# Patient Record
Sex: FEMALE | Race: WHITE | NOT HISPANIC OR LATINO | Employment: FULL TIME | URBAN - METROPOLITAN AREA
[De-identification: names, ages, dates, MRNs, and addresses within clinical notes are randomized per-mention and may not be internally consistent; named-entity substitution may affect disease eponyms.]

---

## 2023-04-25 LAB
ABO GROUP (TYPE) IN BLOOD: NORMAL
ANTIBODY SCREEN: NORMAL
RH FACTOR: NORMAL

## 2023-04-26 LAB
CHLAMYDIA TRACH., AMPLIFIED: NEGATIVE
ERYTHROCYTE DISTRIBUTION WIDTH (RATIO) BY AUTOMATED COUNT: 12.7 % (ref 11.5–14.5)
ERYTHROCYTE MEAN CORPUSCULAR HEMOGLOBIN CONCENTRATION (G/DL) BY AUTOMATED: 34.6 G/DL (ref 32–36)
ERYTHROCYTE MEAN CORPUSCULAR VOLUME (FL) BY AUTOMATED COUNT: 84 FL (ref 80–100)
ERYTHROCYTES (10*6/UL) IN BLOOD BY AUTOMATED COUNT: 4.35 X10E12/L (ref 4–5.2)
HEMATOCRIT (%) IN BLOOD BY AUTOMATED COUNT: 36.7 % (ref 36–46)
HEMOGLOBIN (G/DL) IN BLOOD: 12.7 G/DL (ref 12–16)
HEPATITIS B VIRUS SURFACE AG PRESENCE IN SERUM: NONREACTIVE
HEPATITIS C VIRUS AB PRESENCE IN SERUM: NONREACTIVE
HIV 1/ 2 AG/AB SCREEN: NONREACTIVE
LEUKOCYTES (10*3/UL) IN BLOOD BY AUTOMATED COUNT: 9.1 X10E9/L (ref 4.4–11.3)
N. GONORRHEA, AMPLIFIED: NEGATIVE
NRBC (PER 100 WBCS) BY AUTOMATED COUNT: 0 /100 WBC (ref 0–0)
PLATELETS (10*3/UL) IN BLOOD AUTOMATED COUNT: 250 X10E9/L (ref 150–450)
REFLEX ADDED, ANEMIA PANEL: NORMAL
RUBELLA VIRUS IGG AB: POSITIVE
SYPHILIS TOTAL AB: NONREACTIVE
URINE CULTURE: NO GROWTH

## 2023-06-08 LAB — LAB MOLECULAR CA TECHNICAL NOTES: NORMAL

## 2023-08-04 LAB
GLUCOSE, 1 HR SCREEN, PREG: 83 MG/DL
HEMATOCRIT (%) IN BLOOD BY AUTOMATED COUNT: 36.6 % (ref 36–46)
HEMOGLOBIN (G/DL) IN BLOOD: 12.5 G/DL (ref 12–16)

## 2023-09-14 VITALS — DIASTOLIC BLOOD PRESSURE: 64 MMHG | WEIGHT: 156 LBS | SYSTOLIC BLOOD PRESSURE: 116 MMHG | BODY MASS INDEX: 28.53 KG/M2

## 2023-10-02 ENCOUNTER — PROCEDURE VISIT (OUTPATIENT)
Dept: OBSTETRICS AND GYNECOLOGY | Facility: CLINIC | Age: 38
End: 2023-10-02
Payer: COMMERCIAL

## 2023-10-02 VITALS — SYSTOLIC BLOOD PRESSURE: 112 MMHG | WEIGHT: 161 LBS | DIASTOLIC BLOOD PRESSURE: 68 MMHG | BODY MASS INDEX: 29.45 KG/M2

## 2023-10-02 DIAGNOSIS — O09.523 MULTIGRAVIDA OF ADVANCED MATERNAL AGE IN THIRD TRIMESTER (HHS-HCC): Primary | ICD-10-CM

## 2023-10-02 DIAGNOSIS — Z34.83 SUPERVISION OF NORMAL INTRAUTERINE PREGNANCY IN MULTIGRAVIDA IN THIRD TRIMESTER (HHS-HCC): ICD-10-CM

## 2023-10-02 PROCEDURE — 99213 OFFICE O/P EST LOW 20 MIN: CPT | Performed by: OBSTETRICS & GYNECOLOGY

## 2023-10-02 PROCEDURE — 59025 FETAL NON-STRESS TEST: CPT | Performed by: OBSTETRICS & GYNECOLOGY

## 2023-10-02 NOTE — PROGRESS NOTES
"Subjective   Patient ID [id number]   Missy Paula is a 38 y.o.  at 33w6d with a working estimated date of delivery of 2023, by Last Menstrual Period who presents for a routine prenatal visit. She denies vaginal bleeding, leakage of fluid, decreased fetal movements, or contractions.    Her pregnancy is complicated by:  Advanced maternal age    Objective   Physical Exam:   Weight: 73 kg (161 lb)  Expected Total Weight Gain: 7 kg (15 lb)-11.5 kg (25 lb)   Pregravid BMI: 26.15  BP: 112/68                  Prenatal Labs  Urine Dip:  No results found for: \"KETONESU\", \"GLUCOSEUR\", \"LEUKOCYTESUR\"  Lab Results   Component Value Date    HGB 12.5 2023    HCT 36.6 2023    HEPBSAG NONREACTIVE 2023     No results found for: \"PAPPA\", \"AFP\", \"HCG\", \"ESTRIOL\", \"INHBA\"  No results found for: \"GLUF\", \"GLUT1\", \"NSDAKLK1VC\", \"SBJVARN5UM\"    Imaging  The most recent ultrasound was performed on   with a study GA of   and EFW of  .          Assessment/Plan   Nonstress test reactive.  Advanced maternal age.  Continue weekly NST.  Importance of daily fetal movement.  GBS at 36 weeks.  Continue prenatal vitamin.  Labs reviewed.    Expected mode of delivery vaginal  Follow up in 1 week for a routine prenatal visit.  "

## 2023-10-10 PROBLEM — R35.0 URINARY FREQUENCY: Status: ACTIVE | Noted: 2023-10-10

## 2023-10-10 PROBLEM — K14.6 BURNING TONGUE: Status: ACTIVE | Noted: 2023-10-10

## 2023-10-10 PROBLEM — O21.9 NAUSEA/VOMITING IN PREGNANCY (HHS-HCC): Status: ACTIVE | Noted: 2023-10-10

## 2023-10-10 PROBLEM — N91.2 AMENORRHEA: Status: ACTIVE | Noted: 2023-10-10

## 2023-10-10 PROBLEM — K60.1 CHRONIC ANAL FISSURE: Status: ACTIVE | Noted: 2023-10-10

## 2023-10-10 RX ORDER — FLUTICASONE PROPIONATE 50 MCG
SPRAY, SUSPENSION (ML) NASAL
COMMUNITY
Start: 2023-08-16 | End: 2024-02-27 | Stop reason: ALTCHOICE

## 2023-10-11 ENCOUNTER — PREP FOR PROCEDURE (OUTPATIENT)
Dept: OBSTETRICS AND GYNECOLOGY | Facility: HOSPITAL | Age: 38
End: 2023-10-11

## 2023-10-11 ENCOUNTER — PROCEDURE VISIT (OUTPATIENT)
Dept: OBSTETRICS AND GYNECOLOGY | Facility: CLINIC | Age: 38
End: 2023-10-11
Payer: COMMERCIAL

## 2023-10-11 VITALS — SYSTOLIC BLOOD PRESSURE: 104 MMHG | WEIGHT: 161 LBS | BODY MASS INDEX: 29.45 KG/M2 | DIASTOLIC BLOOD PRESSURE: 64 MMHG

## 2023-10-11 DIAGNOSIS — O21.9 NAUSEA/VOMITING IN PREGNANCY (HHS-HCC): ICD-10-CM

## 2023-10-11 DIAGNOSIS — O09.523 MULTIGRAVIDA OF ADVANCED MATERNAL AGE IN THIRD TRIMESTER (HHS-HCC): Primary | ICD-10-CM

## 2023-10-11 DIAGNOSIS — Z3A.35 35 WEEKS GESTATION OF PREGNANCY (HHS-HCC): ICD-10-CM

## 2023-10-11 DIAGNOSIS — Z34.90 TERM PREGNANCY (HHS-HCC): ICD-10-CM

## 2023-10-11 LAB
POC BLOOD, URINE: NEGATIVE
POC GLUCOSE, URINE: NEGATIVE MG/DL
POC LEUKOCYTES, URINE: NEGATIVE
POC NITRITE,URINE: NEGATIVE
POC PROTEIN, URINE: NEGATIVE MG/DL

## 2023-10-11 PROCEDURE — 81002 URINALYSIS NONAUTO W/O SCOPE: CPT | Performed by: OBSTETRICS & GYNECOLOGY

## 2023-10-11 PROCEDURE — 59025 FETAL NON-STRESS TEST: CPT | Performed by: OBSTETRICS & GYNECOLOGY

## 2023-10-11 RX ORDER — LIDOCAINE HYDROCHLORIDE 10 MG/ML
30 INJECTION INFILTRATION; PERINEURAL ONCE AS NEEDED
Status: CANCELLED | OUTPATIENT
Start: 2023-10-11

## 2023-10-11 RX ORDER — METOCLOPRAMIDE 5 MG/1
10 TABLET ORAL EVERY 6 HOURS PRN
Status: CANCELLED | OUTPATIENT
Start: 2023-10-11

## 2023-10-11 RX ORDER — METOCLOPRAMIDE HYDROCHLORIDE 5 MG/ML
10 INJECTION INTRAMUSCULAR; INTRAVENOUS EVERY 6 HOURS PRN
Status: CANCELLED | OUTPATIENT
Start: 2023-10-11

## 2023-10-11 RX ORDER — TRANEXAMIC ACID 100 MG/ML
1000 INJECTION, SOLUTION INTRAVENOUS ONCE AS NEEDED
Status: CANCELLED | OUTPATIENT
Start: 2023-10-11

## 2023-10-11 RX ORDER — ONDANSETRON HYDROCHLORIDE 2 MG/ML
4 INJECTION, SOLUTION INTRAVENOUS EVERY 6 HOURS PRN
Status: CANCELLED | OUTPATIENT
Start: 2023-10-11

## 2023-10-11 RX ORDER — NIFEDIPINE 10 MG/1
10 CAPSULE ORAL ONCE AS NEEDED
Status: CANCELLED | OUTPATIENT
Start: 2023-10-11

## 2023-10-11 RX ORDER — HYDRALAZINE HYDROCHLORIDE 20 MG/ML
5 INJECTION INTRAMUSCULAR; INTRAVENOUS ONCE AS NEEDED
Status: CANCELLED | OUTPATIENT
Start: 2023-10-11

## 2023-10-11 RX ORDER — CARBOPROST TROMETHAMINE 250 UG/ML
250 INJECTION, SOLUTION INTRAMUSCULAR ONCE AS NEEDED
Status: CANCELLED | OUTPATIENT
Start: 2023-10-11

## 2023-10-11 RX ORDER — OXYTOCIN 10 [USP'U]/ML
10 INJECTION, SOLUTION INTRAMUSCULAR; INTRAVENOUS ONCE AS NEEDED
Status: CANCELLED | OUTPATIENT
Start: 2023-10-11

## 2023-10-11 RX ORDER — LABETALOL HYDROCHLORIDE 5 MG/ML
20 INJECTION, SOLUTION INTRAVENOUS ONCE AS NEEDED
Status: CANCELLED | OUTPATIENT
Start: 2023-10-11

## 2023-10-11 RX ORDER — TERBUTALINE SULFATE 1 MG/ML
0.25 INJECTION SUBCUTANEOUS ONCE AS NEEDED
Status: CANCELLED | OUTPATIENT
Start: 2023-10-11

## 2023-10-11 RX ORDER — METHYLERGONOVINE MALEATE 0.2 MG/ML
0.2 INJECTION INTRAVENOUS ONCE AS NEEDED
Status: CANCELLED | OUTPATIENT
Start: 2023-10-11

## 2023-10-11 RX ORDER — ONDANSETRON 4 MG/1
4 TABLET, FILM COATED ORAL EVERY 6 HOURS PRN
Status: CANCELLED | OUTPATIENT
Start: 2023-10-11

## 2023-10-11 RX ORDER — SODIUM CHLORIDE, SODIUM LACTATE, POTASSIUM CHLORIDE, CALCIUM CHLORIDE 600; 310; 30; 20 MG/100ML; MG/100ML; MG/100ML; MG/100ML
125 INJECTION, SOLUTION INTRAVENOUS CONTINUOUS
Status: CANCELLED | OUTPATIENT
Start: 2023-10-11

## 2023-10-11 RX ORDER — LOPERAMIDE HYDROCHLORIDE 2 MG/1
4 CAPSULE ORAL EVERY 2 HOUR PRN
Status: CANCELLED | OUTPATIENT
Start: 2023-10-11

## 2023-10-11 RX ORDER — MISOPROSTOL 100 UG/1
800 TABLET ORAL ONCE AS NEEDED
Status: CANCELLED | OUTPATIENT
Start: 2023-10-11

## 2023-10-11 NOTE — PROGRESS NOTES
"Subjective   Patient ID [id number]   Missy Paula is a 38 y.o.  at 35w1d with a working estimated date of delivery of 2023, by Last Menstrual Period who presents for a routine prenatal visit. She denies vaginal bleeding, leakage of fluid, decreased fetal movements, or contractions.    Her pregnancy is complicated by:  Advanced maternal age.  Weekly nonstress test    Objective   Physical Exam:   Weight: 73 kg (161 lb)  Expected Total Weight Gain: 7 kg (15 lb)-11.5 kg (25 lb)   Pregravid BMI: 26.15  BP: 104/64  Fetal Heart Rate: nst Fundal Height (cm): 35 cm Presentation: Vertex           Prenatal Labs  Urine Dip:  No results found for: \"KETONESU\", \"GLUCOSEUR\", \"LEUKOCYTESUR\"  Lab Results   Component Value Date    HGB 12.5 2023    HCT 36.6 2023    HEPBSAG NONREACTIVE 2023     No results found for: \"PAPPA\", \"AFP\", \"HCG\", \"ESTRIOL\", \"INHBA\"  No results found for: \"GLUF\", \"GLUT1\", \"GQLDAFF8FA\", \"HOIJGTB6BT\"    Imaging  The most recent ultrasound was performed on   with a study GA of   and EFW of  .          Assessment/Plan     Continue prenatal vitamin.  Labs reviewed.  GBS next visit at 36 weeks  Expected mode of delivery vaginal  Follow up in 1 week for a routine prenatal visit.  "

## 2023-10-18 ENCOUNTER — PROCEDURE VISIT (OUTPATIENT)
Dept: OBSTETRICS AND GYNECOLOGY | Facility: CLINIC | Age: 38
End: 2023-10-18
Payer: COMMERCIAL

## 2023-10-18 VITALS — DIASTOLIC BLOOD PRESSURE: 66 MMHG | SYSTOLIC BLOOD PRESSURE: 106 MMHG | WEIGHT: 163 LBS | BODY MASS INDEX: 29.81 KG/M2

## 2023-10-18 DIAGNOSIS — Z3A.36 36 WEEKS GESTATION OF PREGNANCY (HHS-HCC): ICD-10-CM

## 2023-10-18 DIAGNOSIS — O21.9 NAUSEA/VOMITING IN PREGNANCY (HHS-HCC): ICD-10-CM

## 2023-10-18 DIAGNOSIS — O09.523 MULTIGRAVIDA OF ADVANCED MATERNAL AGE IN THIRD TRIMESTER (HHS-HCC): ICD-10-CM

## 2023-10-18 PROCEDURE — 59025 FETAL NON-STRESS TEST: CPT | Performed by: OBSTETRICS & GYNECOLOGY

## 2023-10-18 PROCEDURE — 81002 URINALYSIS NONAUTO W/O SCOPE: CPT | Performed by: OBSTETRICS & GYNECOLOGY

## 2023-10-18 PROCEDURE — 99213 OFFICE O/P EST LOW 20 MIN: CPT | Performed by: OBSTETRICS & GYNECOLOGY

## 2023-10-18 PROCEDURE — 87081 CULTURE SCREEN ONLY: CPT

## 2023-10-18 NOTE — PROGRESS NOTES
"Subjective   Patient ID [id number]   Missy Paula is a 38 y.o.  at 36w1d with a working estimated date of delivery of 2023, by Last Menstrual Period who presents for a routine prenatal visit. She denies vaginal bleeding, leakage of fluid, decreased fetal movements, or contractions.    Her pregnancy is complicated by:  Advanced maternal age    Objective   Physical Exam:   Weight: 73.9 kg (163 lb)  Expected Total Weight Gain: 7 kg (15 lb)-11.5 kg (25 lb)   Pregravid BMI: 26.15  BP: 106/66                  Prenatal Labs  Urine Dip:  No results found for: \"KETONESU\", \"GLUCOSEUR\", \"LEUKOCYTESUR\"  Lab Results   Component Value Date    HGB 12.5 2023    HCT 36.6 2023    ABO A 2023    HEPBSAG NONREACTIVE 2023     No results found for: \"PAPPA\", \"AFP\", \"HCG\", \"ESTRIOL\", \"INHBA\"  No results found for: \"GLUF\", \"GLUT1\", \"DTERAIV7VC\", \"TCPRFVU3ZQ\"    Imaging  The most recent ultrasound was performed on   with a study GA of   and EFW of  .          Assessment/Plan     Continue prenatal vitamin.  Labs reviewed.  GBS taken.  Expected mode of delivery vaginal  Follow up in 1 week for a routine prenatal visit.  "

## 2023-10-25 ENCOUNTER — PROCEDURE VISIT (OUTPATIENT)
Dept: OBSTETRICS AND GYNECOLOGY | Facility: CLINIC | Age: 38
End: 2023-10-25
Payer: COMMERCIAL

## 2023-10-25 VITALS — SYSTOLIC BLOOD PRESSURE: 116 MMHG | WEIGHT: 164 LBS | BODY MASS INDEX: 30 KG/M2 | DIASTOLIC BLOOD PRESSURE: 64 MMHG

## 2023-10-25 DIAGNOSIS — O09.523 MULTIGRAVIDA OF ADVANCED MATERNAL AGE IN THIRD TRIMESTER (HHS-HCC): ICD-10-CM

## 2023-10-25 DIAGNOSIS — O21.9 NAUSEA/VOMITING IN PREGNANCY (HHS-HCC): ICD-10-CM

## 2023-10-25 DIAGNOSIS — Z3A.37 37 WEEKS GESTATION OF PREGNANCY (HHS-HCC): ICD-10-CM

## 2023-10-25 DIAGNOSIS — Z34.90 TERM PREGNANCY (HHS-HCC): Primary | ICD-10-CM

## 2023-10-25 LAB
GP B STREP GENITAL QL CULT: ABNORMAL
POC BLOOD, URINE: NEGATIVE
POC GLUCOSE, URINE: NEGATIVE MG/DL
POC LEUKOCYTES, URINE: NEGATIVE
POC NITRITE,URINE: NEGATIVE
POC PROTEIN, URINE: NEGATIVE MG/DL

## 2023-10-25 PROCEDURE — 81002 URINALYSIS NONAUTO W/O SCOPE: CPT | Performed by: OBSTETRICS & GYNECOLOGY

## 2023-10-25 PROCEDURE — 99213 OFFICE O/P EST LOW 20 MIN: CPT | Performed by: OBSTETRICS & GYNECOLOGY

## 2023-10-25 PROCEDURE — 59025 FETAL NON-STRESS TEST: CPT | Performed by: OBSTETRICS & GYNECOLOGY

## 2023-10-25 NOTE — PROGRESS NOTES
"Subjective   Patient ID [id number]   Missy Paula is a 38 y.o.  at 37w1d with a working estimated date of delivery of 2023, by Last Menstrual Period who presents for a routine prenatal visit. She denies vaginal bleeding, leakage of fluid, decreased fetal movements, or contractions.    Her pregnancy is complicated by:  Advanced maternal age.  NST reactive.  GBS pending    Objective   Physical Exam:   Weight: 74.4 kg (164 lb)  Expected Total Weight Gain: 7 kg (15 lb)-11.5 kg (25 lb)   Pregravid BMI: 26.15  BP: 116/64                  Prenatal Labs  Urine Dip:  No results found for: \"KETONESU\", \"GLUCOSEUR\", \"LEUKOCYTESUR\"  Lab Results   Component Value Date    HGB 12.5 2023    HCT 36.6 2023    ABO A 2023    HEPBSAG NONREACTIVE 2023     No results found for: \"PAPPA\", \"AFP\", \"HCG\", \"ESTRIOL\", \"INHBA\"  No results found for: \"GLUF\", \"GLUT1\", \"BBPWTPU2HI\", \"LEVFMFM3YZ\"    Imaging  The most recent ultrasound was performed on   with a study GA of   and EFW of  .          Assessment/Plan     Continue prenatal vitamin.  Labs reviewed.  GBS taken.  Expected mode of delivery vaginal.  Review importance of daily fetal movement.  Signs and symptoms of labor  Follow up in 1 week for a routine prenatal visit.  "

## 2023-11-01 ENCOUNTER — PROCEDURE VISIT (OUTPATIENT)
Dept: OBSTETRICS AND GYNECOLOGY | Facility: CLINIC | Age: 38
End: 2023-11-01
Payer: COMMERCIAL

## 2023-11-01 VITALS — BODY MASS INDEX: 30 KG/M2 | SYSTOLIC BLOOD PRESSURE: 114 MMHG | WEIGHT: 164 LBS | DIASTOLIC BLOOD PRESSURE: 78 MMHG

## 2023-11-01 DIAGNOSIS — Z3A.38 38 WEEKS GESTATION OF PREGNANCY (HHS-HCC): ICD-10-CM

## 2023-11-01 DIAGNOSIS — O09.523 MULTIGRAVIDA OF ADVANCED MATERNAL AGE IN THIRD TRIMESTER (HHS-HCC): ICD-10-CM

## 2023-11-01 DIAGNOSIS — O21.9 NAUSEA/VOMITING IN PREGNANCY (HHS-HCC): ICD-10-CM

## 2023-11-01 DIAGNOSIS — O99.210 OBESITY IN PREGNANCY (HHS-HCC): Primary | ICD-10-CM

## 2023-11-01 PROCEDURE — 59025 FETAL NON-STRESS TEST: CPT | Performed by: OBSTETRICS & GYNECOLOGY

## 2023-11-01 PROCEDURE — 99214 OFFICE O/P EST MOD 30 MIN: CPT | Performed by: OBSTETRICS & GYNECOLOGY

## 2023-11-01 PROCEDURE — 81002 URINALYSIS NONAUTO W/O SCOPE: CPT | Performed by: OBSTETRICS & GYNECOLOGY

## 2023-11-01 NOTE — PROGRESS NOTES
"Subjective   Patient ID [id number]   Missy Paula is a 38 y.o.  at 38w1d with a working estimated date of delivery of 2023, by Last Menstrual Period who presents for a routine prenatal visit. She denies vaginal bleeding, leakage of fluid, decreased fetal movements, or contractions.    Her pregnancy is complicated by:  Advanced maternal age.  BMI 30.  Weekly NST.  Scheduled for induction .  Review induction.  Oral Cytotec followed by Pitocin.  Epidural as needed.  Risks of fetal heart rate changes, tachysystole, need for emergent or operative delivery    Objective   Physical Exam:   Weight: 74.4 kg (164 lb)  Expected Total Weight Gain: 7 kg (15 lb)-11.5 kg (25 lb)   Pregravid BMI: 26.15  BP: 114/78                  Prenatal Labs  Urine Dip:  No results found for: \"KETONESU\", \"GLUCOSEUR\", \"LEUKOCYTESUR\"  Lab Results   Component Value Date    HGB 12.5 2023    HCT 36.6 2023    ABO A 2023    HEPBSAG NONREACTIVE 2023     No results found for: \"PAPPA\", \"AFP\", \"HCG\", \"ESTRIOL\", \"INHBA\"  No results found for: \"GLUF\", \"GLUT1\", \"KFEKBYF4CU\", \"ITFBTDU1YQ\"    Imaging  The most recent ultrasound was performed on   with a study GA of   and EFW of  .          Assessment/Plan     Continue prenatal vitamin.  Labs reviewed.  GBS taken.  GBS negative  Expected mode of delivery vaginal  Review importance of daily fetal movement.  Signs and symptoms of labor.  Aware that I am away till   "

## 2023-11-07 ENCOUNTER — ANESTHESIA (OUTPATIENT)
Dept: OBSTETRICS AND GYNECOLOGY | Facility: HOSPITAL | Age: 38
End: 2023-11-07
Payer: COMMERCIAL

## 2023-11-07 ENCOUNTER — ANESTHESIA EVENT (OUTPATIENT)
Dept: OBSTETRICS AND GYNECOLOGY | Facility: HOSPITAL | Age: 38
End: 2023-11-07
Payer: COMMERCIAL

## 2023-11-07 ENCOUNTER — APPOINTMENT (OUTPATIENT)
Dept: OBSTETRICS AND GYNECOLOGY | Facility: HOSPITAL | Age: 38
End: 2023-11-07
Payer: COMMERCIAL

## 2023-11-07 ENCOUNTER — HOSPITAL ENCOUNTER (INPATIENT)
Facility: HOSPITAL | Age: 38
LOS: 2 days | Discharge: HOME | End: 2023-11-09
Attending: OBSTETRICS & GYNECOLOGY | Admitting: OBSTETRICS & GYNECOLOGY
Payer: COMMERCIAL

## 2023-11-07 DIAGNOSIS — Z34.90 TERM PREGNANCY (HHS-HCC): ICD-10-CM

## 2023-11-07 DIAGNOSIS — O09.523 MULTIGRAVIDA OF ADVANCED MATERNAL AGE IN THIRD TRIMESTER (HHS-HCC): ICD-10-CM

## 2023-11-07 LAB
ABO GROUP (TYPE) IN BLOOD: NORMAL
ANTIBODY SCREEN: NORMAL
ERYTHROCYTE [DISTWIDTH] IN BLOOD BY AUTOMATED COUNT: 13.4 % (ref 11.5–14.5)
HCT VFR BLD AUTO: 34.9 % (ref 36–46)
HGB BLD-MCNC: 11.8 G/DL (ref 12–16)
MCH RBC QN AUTO: 27.8 PG (ref 26–34)
MCHC RBC AUTO-ENTMCNC: 33.8 G/DL (ref 32–36)
MCV RBC AUTO: 82 FL (ref 80–100)
NRBC BLD-RTO: 0 /100 WBCS (ref 0–0)
PLATELET # BLD AUTO: 145 X10*3/UL (ref 150–450)
RBC # BLD AUTO: 4.24 X10*6/UL (ref 4–5.2)
RH FACTOR (ANTIGEN D): NORMAL
WBC # BLD AUTO: 7.8 X10*3/UL (ref 4.4–11.3)

## 2023-11-07 PROCEDURE — 85027 COMPLETE CBC AUTOMATED: CPT | Performed by: OBSTETRICS & GYNECOLOGY

## 2023-11-07 PROCEDURE — 86850 RBC ANTIBODY SCREEN: CPT | Performed by: OBSTETRICS & GYNECOLOGY

## 2023-11-07 PROCEDURE — 2500000004 HC RX 250 GENERAL PHARMACY W/ HCPCS (ALT 636 FOR OP/ED): Performed by: NURSE ANESTHETIST, CERTIFIED REGISTERED

## 2023-11-07 PROCEDURE — A59409 PR OBSTETRICAL CARE,VAG DELIV ONLY: Performed by: NURSE ANESTHETIST, CERTIFIED REGISTERED

## 2023-11-07 PROCEDURE — 3700000001 HC GENERAL ANESTHESIA TIME - INITIAL BASE CHARGE: Performed by: NURSE ANESTHETIST, CERTIFIED REGISTERED

## 2023-11-07 PROCEDURE — 3E033VJ INTRODUCTION OF OTHER HORMONE INTO PERIPHERAL VEIN, PERCUTANEOUS APPROACH: ICD-10-PCS | Performed by: OBSTETRICS & GYNECOLOGY

## 2023-11-07 PROCEDURE — 36415 COLL VENOUS BLD VENIPUNCTURE: CPT | Performed by: OBSTETRICS & GYNECOLOGY

## 2023-11-07 PROCEDURE — 2500000004 HC RX 250 GENERAL PHARMACY W/ HCPCS (ALT 636 FOR OP/ED): Performed by: OBSTETRICS & GYNECOLOGY

## 2023-11-07 PROCEDURE — 2500000005 HC RX 250 GENERAL PHARMACY W/O HCPCS: Performed by: NURSE ANESTHETIST, CERTIFIED REGISTERED

## 2023-11-07 PROCEDURE — 59400 OBSTETRICAL CARE: CPT | Performed by: OBSTETRICS & GYNECOLOGY

## 2023-11-07 PROCEDURE — 2500000001 HC RX 250 WO HCPCS SELF ADMINISTERED DRUGS (ALT 637 FOR MEDICARE OP): Performed by: OBSTETRICS & GYNECOLOGY

## 2023-11-07 PROCEDURE — 3700000002 HC GENERAL ANESTHESIA TIME - EACH INCREMENTAL 1 MINUTE: Performed by: NURSE ANESTHETIST, CERTIFIED REGISTERED

## 2023-11-07 PROCEDURE — 1120000001 HC OB PRIVATE ROOM DAILY

## 2023-11-07 PROCEDURE — 10907ZC DRAINAGE OF AMNIOTIC FLUID, THERAPEUTIC FROM PRODUCTS OF CONCEPTION, VIA NATURAL OR ARTIFICIAL OPENING: ICD-10-PCS | Performed by: OBSTETRICS & GYNECOLOGY

## 2023-11-07 PROCEDURE — 59409 OBSTETRICAL CARE: CPT | Performed by: OBSTETRICS & GYNECOLOGY

## 2023-11-07 PROCEDURE — 3E0DXGC INTRODUCTION OF OTHER THERAPEUTIC SUBSTANCE INTO MOUTH AND PHARYNX, EXTERNAL APPROACH: ICD-10-PCS | Performed by: OBSTETRICS & GYNECOLOGY

## 2023-11-07 RX ORDER — LIDOCAINE HYDROCHLORIDE 20 MG/ML
INJECTION, SOLUTION EPIDURAL; INFILTRATION; INTRACAUDAL; PERINEURAL
Status: DISPENSED
Start: 2023-11-07 | End: 2023-11-08

## 2023-11-07 RX ORDER — TRANEXAMIC ACID 100 MG/ML
1000 INJECTION, SOLUTION INTRAVENOUS ONCE AS NEEDED
Status: DISCONTINUED | OUTPATIENT
Start: 2023-11-07 | End: 2023-11-09 | Stop reason: HOSPADM

## 2023-11-07 RX ORDER — OXYTOCIN 10 [USP'U]/ML
10 INJECTION, SOLUTION INTRAMUSCULAR; INTRAVENOUS ONCE AS NEEDED
Status: DISCONTINUED | OUTPATIENT
Start: 2023-11-07 | End: 2023-11-07

## 2023-11-07 RX ORDER — HYDRALAZINE HYDROCHLORIDE 20 MG/ML
5 INJECTION INTRAMUSCULAR; INTRAVENOUS ONCE AS NEEDED
Status: DISCONTINUED | OUTPATIENT
Start: 2023-11-07 | End: 2023-11-09 | Stop reason: HOSPADM

## 2023-11-07 RX ORDER — ONDANSETRON 4 MG/1
4 TABLET, FILM COATED ORAL EVERY 6 HOURS PRN
Status: DISCONTINUED | OUTPATIENT
Start: 2023-11-07 | End: 2023-11-09 | Stop reason: HOSPADM

## 2023-11-07 RX ORDER — TRANEXAMIC ACID 100 MG/ML
1000 INJECTION, SOLUTION INTRAVENOUS ONCE AS NEEDED
Status: DISCONTINUED | OUTPATIENT
Start: 2023-11-07 | End: 2023-11-07

## 2023-11-07 RX ORDER — NIFEDIPINE 10 MG/1
10 CAPSULE ORAL ONCE AS NEEDED
Status: DISCONTINUED | OUTPATIENT
Start: 2023-11-07 | End: 2023-11-07

## 2023-11-07 RX ORDER — MISOPROSTOL 200 UG/1
800 TABLET ORAL ONCE AS NEEDED
Status: DISCONTINUED | OUTPATIENT
Start: 2023-11-07 | End: 2023-11-09 | Stop reason: HOSPADM

## 2023-11-07 RX ORDER — FENTANYL/ROPIVACAINE/NS/PF 2MCG/ML-.2
0-25 PLASTIC BAG, INJECTION (ML) INJECTION CONTINUOUS
Status: DISCONTINUED | OUTPATIENT
Start: 2023-11-07 | End: 2023-11-07

## 2023-11-07 RX ORDER — OXYTOCIN/0.9 % SODIUM CHLORIDE 30/500 ML
60 PLASTIC BAG, INJECTION (ML) INTRAVENOUS ONCE AS NEEDED
Status: DISCONTINUED | OUTPATIENT
Start: 2023-11-07 | End: 2023-11-09 | Stop reason: HOSPADM

## 2023-11-07 RX ORDER — IBUPROFEN 600 MG/1
600 TABLET ORAL EVERY 6 HOURS
Status: DISCONTINUED | OUTPATIENT
Start: 2023-11-07 | End: 2023-11-09 | Stop reason: HOSPADM

## 2023-11-07 RX ORDER — OXYTOCIN 10 [USP'U]/ML
10 INJECTION, SOLUTION INTRAMUSCULAR; INTRAVENOUS ONCE AS NEEDED
Status: DISCONTINUED | OUTPATIENT
Start: 2023-11-07 | End: 2023-11-09 | Stop reason: HOSPADM

## 2023-11-07 RX ORDER — BISACODYL 10 MG/1
10 SUPPOSITORY RECTAL DAILY PRN
Status: DISCONTINUED | OUTPATIENT
Start: 2023-11-07 | End: 2023-11-09 | Stop reason: HOSPADM

## 2023-11-07 RX ORDER — METOCLOPRAMIDE 10 MG/1
10 TABLET ORAL EVERY 6 HOURS PRN
Status: DISCONTINUED | OUTPATIENT
Start: 2023-11-07 | End: 2023-11-07

## 2023-11-07 RX ORDER — ACETAMINOPHEN 325 MG/1
975 TABLET ORAL EVERY 6 HOURS
Status: DISCONTINUED | OUTPATIENT
Start: 2023-11-07 | End: 2023-11-09 | Stop reason: HOSPADM

## 2023-11-07 RX ORDER — OXYTOCIN/0.9 % SODIUM CHLORIDE 30/500 ML
2-30 PLASTIC BAG, INJECTION (ML) INTRAVENOUS CONTINUOUS
Status: DISCONTINUED | OUTPATIENT
Start: 2023-11-07 | End: 2023-11-07

## 2023-11-07 RX ORDER — HYDRALAZINE HYDROCHLORIDE 20 MG/ML
5 INJECTION INTRAMUSCULAR; INTRAVENOUS ONCE AS NEEDED
Status: DISCONTINUED | OUTPATIENT
Start: 2023-11-07 | End: 2023-11-07

## 2023-11-07 RX ORDER — LIDOCAINE HYDROCHLORIDE 10 MG/ML
30 INJECTION INFILTRATION; PERINEURAL ONCE AS NEEDED
Status: DISCONTINUED | OUTPATIENT
Start: 2023-11-07 | End: 2023-11-07

## 2023-11-07 RX ORDER — NIFEDIPINE 10 MG/1
10 CAPSULE ORAL ONCE AS NEEDED
Status: DISCONTINUED | OUTPATIENT
Start: 2023-11-07 | End: 2023-11-09 | Stop reason: HOSPADM

## 2023-11-07 RX ORDER — OXYTOCIN/0.9 % SODIUM CHLORIDE 30/500 ML
60 PLASTIC BAG, INJECTION (ML) INTRAVENOUS
Status: DISCONTINUED | OUTPATIENT
Start: 2023-11-07 | End: 2023-11-07

## 2023-11-07 RX ORDER — MISOPROSTOL 200 UG/1
800 TABLET ORAL ONCE AS NEEDED
Status: DISCONTINUED | OUTPATIENT
Start: 2023-11-07 | End: 2023-11-07

## 2023-11-07 RX ORDER — METOCLOPRAMIDE HYDROCHLORIDE 5 MG/ML
10 INJECTION INTRAMUSCULAR; INTRAVENOUS EVERY 6 HOURS PRN
Status: DISCONTINUED | OUTPATIENT
Start: 2023-11-07 | End: 2023-11-07

## 2023-11-07 RX ORDER — CARBOPROST TROMETHAMINE 250 UG/ML
250 INJECTION, SOLUTION INTRAMUSCULAR ONCE AS NEEDED
Status: DISCONTINUED | OUTPATIENT
Start: 2023-11-07 | End: 2023-11-09 | Stop reason: HOSPADM

## 2023-11-07 RX ORDER — DIPHENHYDRAMINE HYDROCHLORIDE 50 MG/ML
25 INJECTION INTRAMUSCULAR; INTRAVENOUS EVERY 6 HOURS PRN
Status: DISCONTINUED | OUTPATIENT
Start: 2023-11-07 | End: 2023-11-09 | Stop reason: HOSPADM

## 2023-11-07 RX ORDER — LOPERAMIDE HYDROCHLORIDE 2 MG/1
4 CAPSULE ORAL EVERY 2 HOUR PRN
Status: DISCONTINUED | OUTPATIENT
Start: 2023-11-07 | End: 2023-11-07

## 2023-11-07 RX ORDER — ONDANSETRON 4 MG/1
4 TABLET, FILM COATED ORAL EVERY 6 HOURS PRN
Status: DISCONTINUED | OUTPATIENT
Start: 2023-11-07 | End: 2023-11-07

## 2023-11-07 RX ORDER — SIMETHICONE 80 MG
80 TABLET,CHEWABLE ORAL 4 TIMES DAILY PRN
Status: DISCONTINUED | OUTPATIENT
Start: 2023-11-07 | End: 2023-11-09 | Stop reason: HOSPADM

## 2023-11-07 RX ORDER — SODIUM CHLORIDE, SODIUM LACTATE, POTASSIUM CHLORIDE, CALCIUM CHLORIDE 600; 310; 30; 20 MG/100ML; MG/100ML; MG/100ML; MG/100ML
125 INJECTION, SOLUTION INTRAVENOUS CONTINUOUS
Status: DISCONTINUED | OUTPATIENT
Start: 2023-11-07 | End: 2023-11-07

## 2023-11-07 RX ORDER — METHYLERGONOVINE MALEATE 0.2 MG/ML
0.2 INJECTION INTRAVENOUS ONCE AS NEEDED
Status: DISCONTINUED | OUTPATIENT
Start: 2023-11-07 | End: 2023-11-09 | Stop reason: HOSPADM

## 2023-11-07 RX ORDER — POLYETHYLENE GLYCOL 3350 17 G/17G
17 POWDER, FOR SOLUTION ORAL 2 TIMES DAILY PRN
Status: DISCONTINUED | OUTPATIENT
Start: 2023-11-07 | End: 2023-11-09 | Stop reason: HOSPADM

## 2023-11-07 RX ORDER — METHYLERGONOVINE MALEATE 0.2 MG/ML
0.2 INJECTION INTRAVENOUS ONCE AS NEEDED
Status: DISCONTINUED | OUTPATIENT
Start: 2023-11-07 | End: 2023-11-07

## 2023-11-07 RX ORDER — MAGNESIUM HYDROXIDE 2400 MG/10ML
10 SUSPENSION ORAL
Status: DISCONTINUED | OUTPATIENT
Start: 2023-11-07 | End: 2023-11-09 | Stop reason: HOSPADM

## 2023-11-07 RX ORDER — ONDANSETRON HYDROCHLORIDE 2 MG/ML
4 INJECTION, SOLUTION INTRAVENOUS EVERY 6 HOURS PRN
Status: DISCONTINUED | OUTPATIENT
Start: 2023-11-07 | End: 2023-11-07

## 2023-11-07 RX ORDER — LIDOCAINE 560 MG/1
1 PATCH PERCUTANEOUS; TOPICAL; TRANSDERMAL
Status: DISCONTINUED | OUTPATIENT
Start: 2023-11-07 | End: 2023-11-09 | Stop reason: HOSPADM

## 2023-11-07 RX ORDER — LIDOCAINE HYDROCHLORIDE 10 MG/ML
INJECTION, SOLUTION EPIDURAL; INFILTRATION; INTRACAUDAL; PERINEURAL AS NEEDED
Status: DISCONTINUED | OUTPATIENT
Start: 2023-11-07 | End: 2023-11-07

## 2023-11-07 RX ORDER — TERBUTALINE SULFATE 1 MG/ML
0.25 INJECTION SUBCUTANEOUS ONCE AS NEEDED
Status: DISCONTINUED | OUTPATIENT
Start: 2023-11-07 | End: 2023-11-07

## 2023-11-07 RX ORDER — ONDANSETRON HYDROCHLORIDE 2 MG/ML
4 INJECTION, SOLUTION INTRAVENOUS EVERY 6 HOURS PRN
Status: DISCONTINUED | OUTPATIENT
Start: 2023-11-07 | End: 2023-11-09 | Stop reason: HOSPADM

## 2023-11-07 RX ORDER — LABETALOL HYDROCHLORIDE 5 MG/ML
20 INJECTION, SOLUTION INTRAVENOUS ONCE AS NEEDED
Status: DISCONTINUED | OUTPATIENT
Start: 2023-11-07 | End: 2023-11-09 | Stop reason: HOSPADM

## 2023-11-07 RX ORDER — LABETALOL HYDROCHLORIDE 5 MG/ML
20 INJECTION, SOLUTION INTRAVENOUS ONCE AS NEEDED
Status: DISCONTINUED | OUTPATIENT
Start: 2023-11-07 | End: 2023-11-07

## 2023-11-07 RX ORDER — LOPERAMIDE HYDROCHLORIDE 2 MG/1
4 CAPSULE ORAL EVERY 2 HOUR PRN
Status: DISCONTINUED | OUTPATIENT
Start: 2023-11-07 | End: 2023-11-09 | Stop reason: HOSPADM

## 2023-11-07 RX ORDER — CARBOPROST TROMETHAMINE 250 UG/ML
250 INJECTION, SOLUTION INTRAMUSCULAR ONCE AS NEEDED
Status: DISCONTINUED | OUTPATIENT
Start: 2023-11-07 | End: 2023-11-07

## 2023-11-07 RX ORDER — DIPHENHYDRAMINE HCL 25 MG
25 CAPSULE ORAL EVERY 6 HOURS PRN
Status: DISCONTINUED | OUTPATIENT
Start: 2023-11-07 | End: 2023-11-09 | Stop reason: HOSPADM

## 2023-11-07 RX ADMIN — Medication 2 MILLI-UNITS/MIN: at 15:36

## 2023-11-07 RX ADMIN — Medication 5 ML: at 17:24

## 2023-11-07 RX ADMIN — MISOPROSTOL 25 MCG: 100 TABLET ORAL at 09:27

## 2023-11-07 RX ADMIN — SODIUM CHLORIDE, SODIUM LACTATE, POTASSIUM CHLORIDE, AND CALCIUM CHLORIDE 500 ML: 600; 310; 30; 20 INJECTION, SOLUTION INTRAVENOUS at 20:30

## 2023-11-07 RX ADMIN — ACETAMINOPHEN 975 MG: 325 TABLET ORAL at 23:41

## 2023-11-07 RX ADMIN — Medication 3 ML: at 20:47

## 2023-11-07 RX ADMIN — Medication 10 ML/HR: at 17:27

## 2023-11-07 RX ADMIN — SODIUM CHLORIDE, POTASSIUM CHLORIDE, SODIUM LACTATE AND CALCIUM CHLORIDE 125 ML/HR: 600; 310; 30; 20 INJECTION, SOLUTION INTRAVENOUS at 18:33

## 2023-11-07 RX ADMIN — SODIUM CHLORIDE, POTASSIUM CHLORIDE, SODIUM LACTATE AND CALCIUM CHLORIDE 125 ML/HR: 600; 310; 30; 20 INJECTION, SOLUTION INTRAVENOUS at 16:36

## 2023-11-07 RX ADMIN — IBUPROFEN 600 MG: 600 TABLET ORAL at 23:41

## 2023-11-07 RX ADMIN — SODIUM CHLORIDE, POTASSIUM CHLORIDE, SODIUM LACTATE AND CALCIUM CHLORIDE 125 ML/HR: 600; 310; 30; 20 INJECTION, SOLUTION INTRAVENOUS at 09:36

## 2023-11-07 RX ADMIN — Medication 5 ML: at 17:17

## 2023-11-07 RX ADMIN — LIDOCAINE HYDROCHLORIDE 5 ML: 10 INJECTION, SOLUTION EPIDURAL; INFILTRATION; INTRACAUDAL; PERINEURAL at 18:54

## 2023-11-07 RX ADMIN — Medication 5 ML: at 18:54

## 2023-11-07 RX ADMIN — MISOPROSTOL 25 MCG: 100 TABLET ORAL at 11:29

## 2023-11-07 RX ADMIN — Medication 5 ML: at 20:45

## 2023-11-07 SDOH — SOCIAL STABILITY: SOCIAL INSECURITY: PHYSICAL ABUSE: DENIES

## 2023-11-07 SDOH — HEALTH STABILITY: MENTAL HEALTH: CURRENT SMOKER: 0

## 2023-11-07 SDOH — HEALTH STABILITY: MENTAL HEALTH: WISH TO BE DEAD (PAST 1 MONTH): NO

## 2023-11-07 SDOH — HEALTH STABILITY: MENTAL HEALTH: WERE YOU ABLE TO COMPLETE ALL THE BEHAVIORAL HEALTH SCREENINGS?: YES

## 2023-11-07 SDOH — HEALTH STABILITY: MENTAL HEALTH: SUICIDAL BEHAVIOR (LIFETIME): NO

## 2023-11-07 SDOH — SOCIAL STABILITY: SOCIAL INSECURITY: ABUSE SCREEN: ADULT

## 2023-11-07 SDOH — HEALTH STABILITY: MENTAL HEALTH: NON-SPECIFIC ACTIVE SUICIDAL THOUGHTS (PAST 1 MONTH): NO

## 2023-11-07 ASSESSMENT — ACTIVITIES OF DAILY LIVING (ADL)
HEARING - LEFT EAR: FUNCTIONAL
HEARING - RIGHT EAR: FUNCTIONAL
JUDGMENT_ADEQUATE_SAFELY_COMPLETE_DAILY_ACTIVITIES: YES
WALKS IN HOME: INDEPENDENT
TOILETING: INDEPENDENT
BATHING: INDEPENDENT
GROOMING: INDEPENDENT
ADEQUATE_TO_COMPLETE_ADL: YES
FEEDING YOURSELF: INDEPENDENT
PATIENT'S MEMORY ADEQUATE TO SAFELY COMPLETE DAILY ACTIVITIES?: YES
DRESSING YOURSELF: INDEPENDENT

## 2023-11-07 ASSESSMENT — PAIN SCALES - GENERAL
PAINLEVEL_OUTOF10: 0 - NO PAIN

## 2023-11-07 NOTE — NURSING NOTE
Patient re-positioned to right lateral due to late FHR deceleration. LR 500mL fluid bolus initiated.

## 2023-11-07 NOTE — NURSING NOTE
Received phone call from Dr. Ewing, requesting cervical exam on patient. Discussed plan to administer one dose of oral Cytotec.

## 2023-11-07 NOTE — NURSING NOTE
Patient is a  who arrived from home for scheduled induction of labor at 39 weeks. Patient accompanied by , Abel.

## 2023-11-07 NOTE — H&P
Obstetrical Admission History and Physical     Missy Paula is a 38 y.o.  at 39w0d. DENISE: 2023, by Last Menstrual Period. Estimated fetal weight: 7 pounds. She has had prenatal care with Dr. Ewing .    Chief Complaint: Scheduled Induction    Assessment/Plan    -T&S, CBC  -Oral Cytotec and then reevaluate with Dr. Ewing.    Principal Problem:    Term pregnancy      Pregnancy Problems (from 24 to present)       Problem Noted Resolved    Term pregnancy 2023 by Brennen Ewing MD No    Priority:  Medium      Nausea/vomiting in pregnancy 10/10/2023 by Juancho Sagastume No    Priority:  Medium            Options for delivery have been discussed with the patient and she elects for an induction of labor.  Cervical ripening with cytotec, cervidil, other prostaglandin agents has been discussed.  Induction of labor with pitocin, amniotomy, cytotec, and cervical balloon have been discussed in detail. The risks, benefits, complications, alternatives, expected outcomes, potential problems during recuperation and recovery, and the risks of not performing the procedure were discussed with the patient. The patient stated understanding that the risks of delivery include, but are not limited to: death; reaction to medications; injury to bowel, bladder, ureters, uterus, cervix, vagina, and other pelvic and abdominal structures, infection; blood loss and possible need for transfusion; and potential need for surgery, including hysterectomy. The risks of injury to the infant during delivery were also discussed. All questions were answered. There was concurrence with the planned procedure, and the patient wanted to proceed.    Admit to inpatient status. I anticipate that this patient will require a stay exceeding at least 2 midnights for delivery and postpartum.  Induction of labor.  Management of pregnancy complications, as indicated.    Subjective   Good fetal movement. Denies vaginal bleeding. Pregnancy uncomplicated  per patient. She is feeling some mild contractions.    Reason for Induction of Labor:  Pregnancy at 39 weeks or greater for induction       Obstetrical History   OB History    Para Term  AB Living   2 1 1     1   SAB IAB Ectopic Multiple Live Births                  # Outcome Date GA Lbr Avinash/2nd Weight Sex Delivery Anes PTL Lv   2 Current            1 Term 21 40w0d  3260 g M Vag-Spont EPI         Past Medical History  History reviewed. No pertinent past medical history.     Past Surgical History   Past Surgical History:   Procedure Laterality Date    OTHER SURGICAL HISTORY  10/25/2022    Ovarian cystectomy    PELVIC LAPAROSCOPY      2012       Social History  Social History     Tobacco Use    Smoking status: Never    Smokeless tobacco: Never   Substance Use Topics    Alcohol use: Never     Substance and Sexual Activity   Drug Use Never       Allergies  Patient has no known allergies.     Medications  Medications Prior to Admission   Medication Sig Dispense Refill Last Dose    diphth,pertus,acell,,tetanus (BoostRIX) 2.5-8-5 Lf-mcg-Lf/0.5mL injection USE AS DIRECTED (Patient not taking: Reported on 2023) .5 mL 0 More than a month    fluticasone (Flonase) 50 mcg/actuation nasal spray SHAKE LIQUID AND USE 1 SPRAY IN EACH NOSTRIL TWICE DAILY   2023 at 2300       Objective    Last Vitals  Temp Pulse Resp BP MAP O2 Sat   36.7 °C (98.1 °F) 79 18 116/64   96 %     Physical Examination  GENERAL: Examination reveals a well developed, well nourished, gravid female in no acute distress. She is alert and cooperative.  LUNGS: clear to auscultation bilaterally  HEART: regular rate and rhythm, S1, S2 normal, no murmur, click, rub or gallop  ABDOMEN: soft, gravid, nontender, nondistended, no abnormal masses, no epigastric pain  FHR is 130s with moderate variability , with Accelerations, and a Category I tracing.    [place] reading: q 8 min.   The fetus is in a vertex presentation, determined by vaginal  exam  Current Estimated Fetal Weight 7 pounds established by Leopold's maneuver  CERVIX: 1  cm dilated, 70  % effaced, -2  station; MEMBRANES are Intact  EXTREMITIES: no redness or tenderness in the calves or thighs, no edema  PSYCHOLOGICAL: awake and alert; oriented to person, place, and time    Lab Review  Labs in chart were reviewed.

## 2023-11-07 NOTE — ANESTHESIA PREPROCEDURE EVALUATION
Patient: Missy Paula    Evaluation Method: In-person visit    Procedure Information    Date: 11/07/23  Procedure: Labor Analgesia         Relevant Problems   No relevant active problems       Clinical information reviewed:   Tobacco  Allergies  Meds   Med Hx  Surg Hx   Fam Hx          NPO Detail:  NPO/Void Status  Date of Last Liquid: 11/07/23  Time of Last Liquid: 0815  Date of Last Solid: 11/07/23  Time of Last Solid: 0715         OB/GYN     Physical Exam    Airway  Mallampati: III  TM distance: <3 FB  Neck ROM: full     Cardiovascular - normal exam     Dental    Pulmonary - normal exam     Abdominal - normal exam             Anesthesia Plan    ASA 2     epidural     The patient is not a current smoker.  Patient was not previously instructed to abstain from smoking on day of procedure.  Patient did not smoke on day of procedure.    Anesthetic plan and risks discussed with patient.  Use of blood products discussed with patient who consented to blood products.

## 2023-11-07 NOTE — CARE PLAN
Problem: Vaginal Birth or  Section  Goal: Fetal and maternal status remain reassuring during the birth process  Outcome: Progressing  Goal: Tolerate CRB for IOL placement maintenance until dislodgement/removal 12hrs after placement  Outcome: Progressing  Goal: Prevention of malpresentation/labor dystocia through delivery  Outcome: Progressing  Goal: Demonstrates labor coping techniques through delivery  Outcome: Progressing  Goal: Minimal s/sx of HDP and BP<160/110  Outcome: Progressing

## 2023-11-07 NOTE — CARE PLAN
The patient's goals for the shift include Adequate pain relief with epidural    The clinical goals for the shift include FHR to remain rassuring throughout labor.

## 2023-11-07 NOTE — NURSING NOTE
Received phone call from Dr. Ewing. Dr. Ewing updated on patient's recent cervical exam. Discussed that Pitocin was started; patient feeling contractions but not uncomfortable at this time. R Category 1 tracing. VSS. Dr. Ewing states that he will be in around 1930 to see patient.

## 2023-11-07 NOTE — NURSING NOTE
Dr. Holguin notified of abnormal GBS results, stating no GBS was detected and no normal ahmet were detected. Consensus among physicians is that this is a negative GBS result.

## 2023-11-07 NOTE — ANESTHESIA PROCEDURE NOTES
Epidural Block    Patient location during procedure: OB  Start time: 11/7/2023 5:13 PM  End time: 11/7/2023 5:15 PM  Reason for block: labor analgesia  Staffing  Performed: CRNA   Authorized by: ANGELICA Minaya    Performed by: ANGELICA Minaya    Preanesthetic Checklist  Completed: patient identified, IV checked, risks and benefits discussed, surgical consent, pre-op evaluation, timeout performed and sterile techniques followed  Block Timeout  RN/Licensed healthcare professional reads aloud to the Anesthesia provider and entire team: Patient identity, procedure with side and site, patient position, and as applicable the availability of implants/special equipment/special requirements.  Patient on coagulant treatment: no  Timeout performed at: 11/7/2023 5:12 PM  Block Placement  Patient position: sitting  Prep: ChloraPrep  Sterility prep: cap, gloves and mask  Sedation level: no sedation  Patient monitoring: blood pressure, continuous pulse oximetry and heart rate  Approach: midline  Local numbing: lidocaine 1% to skin and subcutaneous tissues  Vertebral space: lumbar  Epidural  Loss of resistance technique: air  Guidance: landmark technique        Needle  Needle type: Tuohy   Needle gauge: 17  Needle length: 8.9cm  Needle insertion depth: 8 cm  Catheter type: multi-orifice  Catheter size: 19 G  Catheter at skin depth: 15 cm  Catheter securement method: clear occlusive dressing    Test dose: lidocaine 1.5% with epinephrine 1-to-200,000  Test dose given at 11/7/2023 5:16 PM  Test dose: lidocaine 1.5% with epinephrine 1-to-200,000  Test dose result: no positive test dose            Assessment bilateral  Block outcome: patient comfortable  Number of attempts: 1  Events: no positive test dose  Procedure assessment: patient tolerated procedure well with no immediate complications

## 2023-11-07 NOTE — NURSING NOTE
Patient taken off external monitors in order to ambulate hallways and use large, round birthing ball. Discussed need to assess FHR every 30 minutes with contraction. Patient expressed understanding, voices no questions or concerns at this time.

## 2023-11-07 NOTE — LACTATION NOTE
"   23 1245   Lactation Consultation   Reason for Consult Initial assessment   Consultant Name Dillan Bailey   Maternal Information   Has mother  before? Yes   How long did the mother previously breastfeed? for up to 3 months with supplementation,    Patient Follow-Up   Inpatient Lactation Follow-up Needed  Yes   Other OB Lactation Documentation    Maternal Risk Factors   (history of pain with feedings and low milk supply requiring supplementation, states infant was sleeping through the night and not waking to feed)   38 year old  experienced breastfeeding patient currently in labor. Met with parents for routine lactation consult to assess breastfeeding goals and to provide lactation support and education. Verbalizes goal of \"trying\" to breastfeed, states they are not opposed to using infant formula if needed. Reports breast changes during pregnancy. Has a personal breast pump for home use.    Breastfeeding handouts provided. Breastfeeding education and support provided throughout consult. Patient and family provided with the opportunity to ask questions. All questions answered. Provided education on milk supply, tips to maximize supply and breastfeeding success. Discussed potential factors that could have contributed to her breastfeeding difficulty with her first child (painful/ineffective latch, infant sleeping through the night, early supplementation, etc.)     See education flow sheet for detailed list of education topics covered. Reviewed importance of frequent skin to skin contact, waking techniques, infant stomach capacity, value of colostrum feeds and typical  feeding behaviors in the first 24 hours. Encouraged frequent skin to skin and nursing with cues and at least 8 times in the first 24 hours. Reviewed signs of adequate intake/output. Patient and family deny any further questions or concerns at this time. Offered ongoing breastfeeding assistance, support and education as needed " and desired.

## 2023-11-07 NOTE — NURSING NOTE
Dr. Holguin at bedside. Physical assessment completed. Plan of care discussed including administration of Cytotec. Patient expressed understanding, voices no questions or concerns at this time.

## 2023-11-07 NOTE — PROGRESS NOTES
Patient feeling contractions. Nurse checked her and she is still 1 cm. Dr. Ewing ordered another oral Cytotec followed by Pitocin.  FHR: 140s with moderate variability and accels. Category I.  Ctxs: q 3-5 min. Prior to coming off the monitor.

## 2023-11-07 NOTE — NURSING NOTE
"SVE = unchanged from previous exam. Patient comfortable at this time, denies pain, reports feeling occasional \"pressure\" with contractions.  "

## 2023-11-07 NOTE — NURSING NOTE
Patient sitting up in bed, eating lunch, visiting with family. Patient comfortable at this time, denies any needs. , Abel, supportive at bedside.

## 2023-11-07 NOTE — NURSING NOTE
Dr. Ewing notified via secure message to inquire if it is okay for patient to ambulate hallways and do intermittent auscultation. FHR Category 1 tracing. Dr. Ewing states it is okay.

## 2023-11-08 ENCOUNTER — APPOINTMENT (OUTPATIENT)
Dept: OBSTETRICS AND GYNECOLOGY | Facility: CLINIC | Age: 38
End: 2023-11-08
Payer: COMMERCIAL

## 2023-11-08 LAB
ERYTHROCYTE [DISTWIDTH] IN BLOOD BY AUTOMATED COUNT: 13.2 % (ref 11.5–14.5)
HCT VFR BLD AUTO: 32.3 % (ref 36–46)
HGB BLD-MCNC: 10.8 G/DL (ref 12–16)
MCH RBC QN AUTO: 27.8 PG (ref 26–34)
MCHC RBC AUTO-ENTMCNC: 33.4 G/DL (ref 32–36)
MCV RBC AUTO: 83 FL (ref 80–100)
NRBC BLD-RTO: 0 /100 WBCS (ref 0–0)
PLATELET # BLD AUTO: 127 X10*3/UL (ref 150–450)
RBC # BLD AUTO: 3.89 X10*6/UL (ref 4–5.2)
WBC # BLD AUTO: 13.6 X10*3/UL (ref 4.4–11.3)

## 2023-11-08 PROCEDURE — 36415 COLL VENOUS BLD VENIPUNCTURE: CPT | Performed by: OBSTETRICS & GYNECOLOGY

## 2023-11-08 PROCEDURE — 2500000001 HC RX 250 WO HCPCS SELF ADMINISTERED DRUGS (ALT 637 FOR MEDICARE OP): Performed by: OBSTETRICS & GYNECOLOGY

## 2023-11-08 PROCEDURE — 2500000005 HC RX 250 GENERAL PHARMACY W/O HCPCS: Performed by: OBSTETRICS & GYNECOLOGY

## 2023-11-08 PROCEDURE — 85027 COMPLETE CBC AUTOMATED: CPT | Performed by: OBSTETRICS & GYNECOLOGY

## 2023-11-08 PROCEDURE — 1120000001 HC OB PRIVATE ROOM DAILY

## 2023-11-08 RX ORDER — IBUPROFEN 600 MG/1
600 TABLET ORAL EVERY 6 HOURS PRN
Qty: 28 TABLET | Refills: 0 | Status: SHIPPED | OUTPATIENT
Start: 2023-11-08 | End: 2023-11-09 | Stop reason: HOSPADM

## 2023-11-08 RX ADMIN — ACETAMINOPHEN 975 MG: 325 TABLET ORAL at 18:37

## 2023-11-08 RX ADMIN — IBUPROFEN 600 MG: 600 TABLET ORAL at 06:20

## 2023-11-08 RX ADMIN — IBUPROFEN 600 MG: 600 TABLET ORAL at 13:35

## 2023-11-08 RX ADMIN — IBUPROFEN 600 MG: 600 TABLET ORAL at 18:38

## 2023-11-08 RX ADMIN — ACETAMINOPHEN 975 MG: 325 TABLET ORAL at 06:20

## 2023-11-08 RX ADMIN — Medication 1 APPLICATION: at 02:54

## 2023-11-08 RX ADMIN — BENZOCAINE AND LEVOMENTHOL 1 APPLICATION: 200; 5 SPRAY TOPICAL at 02:54

## 2023-11-08 RX ADMIN — WITCH HAZEL 1 EACH: 500 SOLUTION RECTAL; TOPICAL at 02:54

## 2023-11-08 RX ADMIN — ACETAMINOPHEN 975 MG: 325 TABLET ORAL at 13:36

## 2023-11-08 ASSESSMENT — PAIN SCALES - GENERAL
PAINLEVEL_OUTOF10: 0 - NO PAIN
PAINLEVEL_OUTOF10: 3
PAIN_LEVEL: 0
PAINLEVEL_OUTOF10: 1
PAINLEVEL_OUTOF10: 1

## 2023-11-08 ASSESSMENT — PAIN DESCRIPTION - DESCRIPTORS
DESCRIPTORS: CRAMPING
DESCRIPTORS: CRAMPING

## 2023-11-08 NOTE — LACTATION NOTE
This note was copied from a baby's chart.  Lactation Consultant Note  Lactation Consultation  Reason for Consult: Initial assessment  Consultant Name: MAXINE Waller    Maternal Information  Has mother  before?: Yes  How long did the mother previously breastfeed?: 3 months with supplementation  Previous Maternal Breastfeeding Challenges: Low milk supply  Infant to breast within first 2 hours of birth?: Yes  Exclusive Pump and Bottle Feed: No    Maternal Assessment  Breast Assessment: Medium, Soft, Warm, Compressible  Nipple Assessment: Intact, Erect  [place] Assessment: Normal    Infant Assessment  Infant Behavior: Light sleep, Sucking, Feeding cues observed, Content after feeding    Feeding Assessment  Nutrition Source: Breastmilk  Feeding Method: Nursing at the breast  Feeding Position: Cross - cradle, Skin to skin, Both sides, Nose lightly touching breast, Mother demonstrates good positioning  Suck/Feeding: Sustained, Audible swallowing, Baby led rhythmically, Coordinated suck/swallow/breathe, Content after feeding  Latch Assessment: Minimal assistance is needed, Instructed on deep latch, Eagerly grasped on to latch, Lower lip turned in, Comfortable latch    LATCH TOOL  Latch: Grasps breast, tongue down, lips flanged, rhythmic sucking  Audible Swallowing: A few with stimulation  Type of Nipple: Everted (After stimulation)  Comfort (Breast/Nipple): Soft/non-tender  Hold (Positioning): No assist from staff, mother able to position/hold infant  LATCH Score: 9    Breast Pump       Other OB Lactation Tools  Lactation Tools: Lanolin    Patient Follow-up       Other OB Lactation Documentation  Maternal Risk Factors: Previous low supply    Recommendations/Summary   breastfeeding mother and infant. Mother states that she breasted with her first child and found out at his one month appointment that he wasn't gaining weight like he was supposed to. Supplementation started at that time. Mother states that  as she began to supplement more, he slept through the night and her supply dropped. Mother is wanting to breastfeed this infant for 3 months, but is open to supplementation if needed or if it makes life easier ( she has an 18 month old at home and a 9 year old stepson). LC reviewed ways to increase milk supply and encouraged lots of skin to skin.   Infant latched to left breast in cross cradle hold. Infant initially sleepy, but LC undressed infant and infant perked right up. Mother hand expressed colostrum to tip of nipple and brushed it down infant's nose and lips. Infant opened wide and was brought into the beast. Bottom lip curled in at beginning of latch. LC talked mother through how to correct that. Once infants bottom lip was correctly positioned, mother states that the latch felt like a stronger tug. Infant nursed well on left breast for 25 minutes with intermittent swallowing appreciated. All questions answered at this time. Mother with visitor in room at this time and LC encouraged mother to call for help with any other questions or concerns. Mother stated that she was going to allow the infant to nurse for several more minutes on the left breast and then offer the right.     Ongoing assistance with breastfeeding offered. No further questions or concerns at this time.

## 2023-11-08 NOTE — ANESTHESIA PROCEDURE NOTES
Epidural Block    Patient location during procedure: OB  Start time: 11/7/2023 8:41 PM  End time: 11/7/2023 8:43 PM  Reason for block: labor analgesia  Staffing  Performed: CRNA   Authorized by: ANGELICA Minaya    Performed by: ANGELICA Minaya    Preanesthetic Checklist  Completed: patient identified, IV checked, risks and benefits discussed, surgical consent, pre-op evaluation, timeout performed and sterile techniques followed  Block Timeout  RN/Licensed healthcare professional reads aloud to the Anesthesia provider and entire team: Patient identity, procedure with side and site, patient position, and as applicable the availability of implants/special equipment/special requirements.  Patient on coagulant treatment: no  Timeout performed at: 11/7/2023 8:40 PM  Block Placement  Patient position: sitting  Prep: ChloraPrep  Sterility prep: cap, gloves and mask  Sedation level: no sedation  Patient monitoring: blood pressure, continuous pulse oximetry and heart rate  Approach: midline  Local numbing: lidocaine 1% to skin and subcutaneous tissues  Vertebral space: lumbar  Lumbar location: L3-L4  Epidural  Loss of resistance technique: air  Guidance: landmark technique        Needle  Needle type: Tuohy   Needle gauge: 17  Needle length: 8.9cm  Needle insertion depth: 7 cm  Catheter type: multi-orifice  Catheter size: 19 G  Catheter at skin depth: 15 cm  Catheter securement method: clear occlusive dressing    Test dose: lidocaine 1.5% with epinephrine 1-to-200,000  Test dose given at 11/7/2023 8:43 PM  Test dose: lidocaine 1.5% with epinephrine 1-to-200,000  Test dose result: no positive test dose            Assessment bilateral  Block outcome: patient comfortable  Number of attempts: 1  Events: no positive test dose  Procedure assessment: patient tolerated procedure well with no immediate complications

## 2023-11-08 NOTE — ANESTHESIA POSTPROCEDURE EVALUATION
Patient: Missy Paula    Procedure Summary       Date: 11/07/23 Room / Location:     Anesthesia Start: 1711 Anesthesia Stop: 2149    Procedure: Labor Analgesia Diagnosis:     Scheduled Providers:  Responsible Provider: ANGELICA Minaya    Anesthesia Type: epidural ASA Status: 2            Anesthesia Type: epidural    Vitals Value Taken Time   /50 11/08/23 0611   Temp 36.6 11/08/23 0611   Pulse 68 11/08/23 0611   Resp 17 11/08/23 0611   SpO2 97 11/08/23 0611       Anesthesia Post Evaluation    Patient location during evaluation: bedside  Patient participation: complete - patient participated  Level of consciousness: awake  Pain score: 0  Pain management: adequate  Airway patency: patent  Cardiovascular status: acceptable  Respiratory status: acceptable  Hydration status: acceptable        No notable events documented.

## 2023-11-08 NOTE — PROGRESS NOTES
"Missy Paula is a 38  s/p Vaginal Delivery on  (POD#1)    Subjective   Patient is doing well; denies having CP or SOB.  Ambulated to/from BR with RN, left leg just a little bit numb from her epidural otherwise no complaints.      Objective   BP stable  UOP adequate  Lochia: describes as normal    Physical Exam  Gen: NAD  Eyes:  EOM intact  ENT: MMM  Neck: No JVD  Respiratory: CTAB, no wheezes/rhonchi  Cardiac: RRR, no murmurs rubs or gallops  Abdomen: soft, NT, +BS  Extremities: trace (B) LE edema, no cyanosis  Neuro: No focal deficits, alert and oriented x 3  Psych:  appropriate mood and behavior    Last Recorded Vitals  Blood pressure 106/57, pulse 60, temperature 37 °C (98.6 °F), temperature source Temporal, resp. rate 18, height 1.575 m (5' 2\"), weight 74.7 kg (164 lb 10.9 oz), last menstrual period 2023, SpO2 97 %, currently breastfeeding.    Intake/Output last 3 Shifts:  I/O last 3 completed shifts:  In: 3175.8 (42.5 mL/kg) [I.V.:1342.5 (18 mL/kg); IV Piggyback:1833.3]  Out: 1190 (15.9 mL/kg) [Urine:1000 (0.4 mL/kg/hr); Blood:190]  Weight: 74.7 kg     Relevant Results  Results for orders placed or performed during the hospital encounter of 23 (from the past 24 hour(s))   Type And Screen   Result Value Ref Range    ABO TYPE A     Rh TYPE POS     ANTIBODY SCREEN NEG    CBC   Result Value Ref Range    WBC 7.8 4.4 - 11.3 x10*3/uL    nRBC 0.0 0.0 - 0.0 /100 WBCs    RBC 4.24 4.00 - 5.20 x10*6/uL    Hemoglobin 11.8 (L) 12.0 - 16.0 g/dL    Hematocrit 34.9 (L) 36.0 - 46.0 %    MCV 82 80 - 100 fL    MCH 27.8 26.0 - 34.0 pg    MCHC 33.8 32.0 - 36.0 g/dL    RDW 13.4 11.5 - 14.5 %    Platelets 145 (L) 150 - 450 x10*3/uL   CBC   Result Value Ref Range    WBC 13.6 (H) 4.4 - 11.3 x10*3/uL    nRBC 0.0 0.0 - 0.0 /100 WBCs    RBC 3.89 (L) 4.00 - 5.20 x10*6/uL    Hemoglobin 10.8 (L) 12.0 - 16.0 g/dL    Hematocrit 32.3 (L) 36.0 - 46.0 %    MCV 83 80 - 100 fL    MCH 27.8 26.0 - 34.0 pg    MCHC 33.4 32.0 - " 36.0 g/dL    RDW 13.2 11.5 - 14.5 %    Platelets 127 (L) 150 - 450 x10*3/uL      Assessment  - POD#1 s/p Vaginal Delivery  - Acute on chronic blood loss anemia    Plan  - Continue scheduled acetaminophen and ibuprofen  - Encourage her to ambulate frequently throughout the day  - Home tomorrow  - Follow-up with Dr. Ewing in six weeks        I spent 30 minutes in the professional and overall care of this patient.      SITA DuranC

## 2023-11-08 NOTE — CARE PLAN
The patient's goals for the shift include Adequate pain relief with epidural    The clinical goals for the shift include FHR to remain stable.       Pt ended up getting her epidural replaced and had much better relief. Delivery at 20239. Bonding and breastfeeding with baby girl throughout the night. Bleeding and fundal checks WNL. L leg was numb still so at 0230 she got up to BSC and urinated and then at 0620 L leg was not longer numb so she was able to walk to bathroom and urinate. No dizziness. Tolerated well.   Problem: Vaginal Birth or  Section  Goal: No s/sx of hemorrhage through recovery  Outcome: Progressing     Problem: Vaginal Birth or  Section  Goal: Minimal s/sx of HDP and BP<160/110  Outcome: Progressing     Problem: Vaginal Birth or  Section  Goal: Demonstrates labor coping techniques through delivery  Outcome: Met     Problem: Postpartum  Goal: Appropriate maternal -  bonding  Outcome: Progressing     Problem: Postpartum  Goal: Experiences normal postpartum course  Outcome: Progressing     Problem: Postpartum  Goal: No s/sx of hemorrhage  Outcome: Progressing

## 2023-11-08 NOTE — SIGNIFICANT EVENT
11/08/23 0135   Fundus   Fundus WDL WDL   Fundal Tone Firm   Fundal Position Midline   Fundus Location U/1   Lochia   Lochia WDL WDL   Amount Scant   Lochia Color Rubra   Lochia Odor None   Clots None     Attempted to get pt out of bed for first time to bathroom. L leg still numb and pt is unable to move L leg on own. Bleeding WNL and fundus midline firm.

## 2023-11-08 NOTE — NURSING NOTE
Report given to Bere Charlton RN. Plan of care discussed. Patient comfortable in throne position at this time, denies any needs.

## 2023-11-08 NOTE — NURSING NOTE
CHRISTINA Russell CRNA at bedside. Epidural infusion increased to 12mL/hr due to patient's reports of intermittent pain with contractions.

## 2023-11-08 NOTE — PROGRESS NOTES
"Missy Paula is a 38 y.o. female on day 0 of admission presenting with Term pregnancy.    Subjective   Category 1 tracing.  Starting to feel some pressure       Objective fully dilated.  Begin pushing    Physical Exam    Last Recorded Vitals  Blood pressure 114/60, pulse 87, temperature 36.6 °C (97.9 °F), temperature source Oral, resp. rate 18, height 1.575 m (5' 2\"), weight 74.7 kg (164 lb 10.9 oz), last menstrual period 02/07/2023, SpO2 100 %.  Intake/Output last 3 Shifts:  I/O last 3 completed shifts:  In: 2063.3 (27.6 mL/kg) [I.V.:730 (9.8 mL/kg); IV Piggyback:1333.3]  Out: - (0 mL/kg)   Weight: 74.7 kg     Relevant Results                             Assessment/Plan   Principal Problem:    Term pregnancy    Expected vaginal delivery       I spent 10 minutes in the professional and overall care of this patient.      Brennen Ewing MD      "

## 2023-11-08 NOTE — PROGRESS NOTES
Intrapartum Progress Note    Assessment/Plan   Missy Paula is a 38 y.o.  at 39w0d. DENISE: 2023, by Last Menstrual Period.     Patient doing well, continue Pitocin  AROM clear fluid  GBS positive, PCN    Principal Problem:    Term pregnancy    Pregnancy Problems (from 23 to present)       Problem Noted Resolved    Term pregnancy 2023 by Brennen Ewing MD No    Priority:  Medium      Nausea/vomiting in pregnancy 10/10/2023 by Juancho Sagastume No    Priority:  Medium              Subjective   Patient doing well, comfortable     Objective   Last Vitals:  Temp Pulse Resp BP MAP Pulse Ox   36.6 °C (97.9 °F) 69 18 116/72   98 %     Vitals Min/Max Last 24 Hours:  Temp  Min: 36.6 °C (97.9 °F)  Max: 36.8 °C (98.3 °F)  Pulse  Min: 60  Max: 90  Resp  Min: 16  Max: 18  BP  Min: 105/65  Max: 132/64    Intake/Output:    Intake/Output Summary (Last 24 hours) at 2023  Last data filed at 2023 1830  Gross per 24 hour   Intake 2063.33 ml   Output --   Net 2063.33 ml       Physical Examination:  GENERAL: Examination reveals a well developed, well nourished, gravid female in no acute distress. She is alert and cooperative.  LUNGS:  Normal effort  FHR is 150 , with Accelerations, and a Category I tracing.    [place] reading:  q2  The fetus is in a vertex presentation, determined by vaginal exam  CERVIX: 4 cm dilated, 60 % effaced, -3 station; MEMBRANES are AROM  PSYCHOLOGICAL:  Normal mood/affect    Lab Review:  Lab Results   Component Value Date    WBC 7.8 2023    HGB 11.8 (L) 2023    HCT 34.9 (L) 2023     (L) 2023     Amira Elliott DO

## 2023-11-08 NOTE — L&D DELIVERY NOTE
OB Delivery Note  2023  Missy Paula  38 y.o.   Vaginal, Spontaneous        Gestational Age: 39w0d  /Para:   Quantitative Blood Loss: Admission to Discharge: 0 mL (2023  7:48 AM - 2023 10:12 PM)    Jose Paula [[id number]]      Labor Events    Rupture date/time: 2023  Rupture type: Artificial  Fluid color: Clear  Fluid odor: None  Labor type: Induced Onset of Labor  Labor allowed to proceed with plans for an attempted vaginal birth?: Yes  Induction: Misoprostol, Oxytocin  First cervical ripening date/time: 2023  Induction date/time: 2023  Induction indications: Other       Placenta    Placenta delivery date/time: 2023  Placenta removal: Spontaneous  Placenta appearance: Intact  Placenta disposition: discarded       Cord    Vessels: 3 vessels  Complications: None  Delayed cord clamping?: Yes  Cord blood disposition: Refrigerator  Gases sent?: No       Anesthesia    Method: Epidural       Operative Delivery    Forceps attempted?: No  Vacuum extractor attempted?: No       Shoulder Dystocia    Shoulder dystocia present?: No        Delivery    Time head delivered: 2023 21:49:00  Birth date/time: 2023 21:49:00  Delivery type: Vaginal, Spontaneous       Resuscitation    Method: Tactile stimulation       Apgars    Living status: Living  Apgar Component Scores:  1 min.:  5 min.:  10 min.:  15 min.:  20 min.:    Skin color:  1  1       Heart rate:  2  2       Reflex irritability:  2  2       Muscle tone:  2  2       Respiratory effort:  2  2       Total:  9  9              Delivery Providers    Delivering clinician: Brnenen Ewing MD   Provider Role    Bere Charlton, RN Delivery Nurse    Paulina Badillo, RN Nursery Nurse     Resident                 Brennen Ewing MD

## 2023-11-09 ENCOUNTER — PHARMACY VISIT (OUTPATIENT)
Dept: PHARMACY | Facility: CLINIC | Age: 38
End: 2023-11-09
Payer: MEDICARE

## 2023-11-09 VITALS
BODY MASS INDEX: 30.31 KG/M2 | HEIGHT: 62 IN | DIASTOLIC BLOOD PRESSURE: 60 MMHG | TEMPERATURE: 97.3 F | RESPIRATION RATE: 18 BRPM | WEIGHT: 164.68 LBS | SYSTOLIC BLOOD PRESSURE: 105 MMHG | OXYGEN SATURATION: 98 % | HEART RATE: 59 BPM

## 2023-11-09 PROBLEM — Z34.90 TERM PREGNANCY (HHS-HCC): Status: RESOLVED | Noted: 2023-11-07 | Resolved: 2023-11-09

## 2023-11-09 PROCEDURE — 2500000001 HC RX 250 WO HCPCS SELF ADMINISTERED DRUGS (ALT 637 FOR MEDICARE OP): Performed by: OBSTETRICS & GYNECOLOGY

## 2023-11-09 PROCEDURE — RXMED WILLOW AMBULATORY MEDICATION CHARGE

## 2023-11-09 RX ORDER — IBUPROFEN 600 MG/1
600 TABLET ORAL EVERY 6 HOURS PRN
Qty: 28 TABLET | Refills: 0 | Status: SHIPPED | OUTPATIENT
Start: 2023-11-09 | End: 2023-12-19 | Stop reason: ALTCHOICE

## 2023-11-09 RX ADMIN — ACETAMINOPHEN 975 MG: 325 TABLET ORAL at 00:48

## 2023-11-09 RX ADMIN — IBUPROFEN 600 MG: 600 TABLET ORAL at 06:40

## 2023-11-09 RX ADMIN — ACETAMINOPHEN 975 MG: 325 TABLET ORAL at 06:40

## 2023-11-09 RX ADMIN — IBUPROFEN 600 MG: 600 TABLET ORAL at 00:49

## 2023-11-09 ASSESSMENT — PAIN SCALES - GENERAL
PAINLEVEL_OUTOF10: 1
PAINLEVEL_OUTOF10: 3
PAINLEVEL_OUTOF10: 0 - NO PAIN

## 2023-11-09 NOTE — CARE PLAN
Problem: Postpartum  Goal: Experiences normal postpartum course  Outcome: Met  Goal: Appropriate maternal -  bonding  Outcome: Met  Goal: Establish and maintain infant feeding pattern for adequate nutrition  Outcome: Met  Goal: No s/sx of hemorrhage  Outcome: Met  Goal: Minimal s/sx of HDP and BP<160/110  Outcome: Met    The patient's goals for the shift include meeting with lactation prior to discharge home.    The clinical goals for the shift include effective latch/feeding pattern.

## 2023-11-09 NOTE — NURSING NOTE
Patient just finished breastfeeding , notes that  cluster fed all night. [place] appears content following feed. Patient states that she would like to meet with lactation prior to discharge home today. VSS. Head-to-toe assessment completed. Breathing even and unlabored. Lung sounds CTA. Bowel sounds normoactive and present x 4 quadrants. Fundus firm, midline, U/2. Lochia light. Patient reports voiding without difficulty and notes passing flatus. Non-pitting edema noted on the bilateral lower extremities. Patient denies calf pain. Birth certificate obtained. Patient denies any other needs, voices no questions or concerns. , Abel, supportive at bedside.

## 2023-11-09 NOTE — NURSING NOTE
Patient breastfeeding  at this time, states that she was able to meet with lactation, has no questions or concerns prior to discharge. Patient comfortable, denies any needs.

## 2023-11-09 NOTE — DISCHARGE INSTRUCTIONS
Walk around intermittently throughout the day.  Up and down the stairs is fine.   Stay hydrated, drink plenty of water daily  Do not place anything in your vagina, no sex for 6 weeks  Call the office or go to the hospital if any of these symptoms occur:  Signs of infection such as a fever of 100.4°F (38°C) or higher, chills, pain with passing urine, or wound that will not heal.  Sudden shortness of breath or a sudden onset of chest pain could be a sign that a blood clot has traveled to your lungs. Go to the ER right away.  Signs of wound infection such as swelling, redness, warmth around the wound; too much pain when touched; yellowish, greenish, or bloody discharge; foul smell coming from the cut site; cut site opens up.  Problems with pain that does not go away, or gets worse.  Swollen, hard, or painful breasts with a fever of 100.4°F or higher.  Feeling very sad or low mood.  Problems with your urine or bowel movements.  Sudden, large amounts of vaginal bleeding.

## 2023-11-09 NOTE — LACTATION NOTE
This note was copied from a baby's chart.  Lactation Consultant Note  Lactation Consultation  Reason for Consult: Follow-up assessment  Consultant Name: ADOLPH Thapa RN, CBS    Maternal Information  Has mother  before?: Yes  How long did the mother previously breastfeed?: 3 months with supplementation  Previous Maternal Breastfeeding Challenges: Low milk supply  Exclusive Pump and Bottle Feed: No    Maternal Assessment  Breast Assessment: Medium, Large, Soft, Warm, Compressible  Nipple Assessment: Intact, Sore, Erect  Alterations in Nipple Condition: Stage I - pain or irritation with no skin break down  [place] Assessment: Normal    Infant Assessment  Infant Behavior: Light sleep, Readiness to feed, Feeding cues observed, Rooting response, Sucking  Infant Assessment:  (39 weeks, approximately 35 HOL, 3 voids/2 stools in last 24 hours, -5.18% weight loss @25 HOL)    Feeding Assessment  Nutrition Source: Breastmilk  Feeding Method: Nursing at the breast  Feeding Position: Breast sandwich, Football/seated, Nipple to nose, Mother demonstrates good positioning, Nose lightly touching breast  Suck/Feeding: Sustained, Baby led rhythmically, Coordinated suck/swallow/breathe, Audible swallowing  Latch Assessment: Instructed on deep latch, Eagerly grasped on to latch, Deep latch obtained, Optimal angle of mouth opening, Comfortable with no pain, Sucking and swallowing, Sucks with long jaw movement, Bursts of sucking, swallowing, and rest, Flanged lips, Chin moves in rhythmic motion, Comfortable latch    LATCH TOOL  Latch: Grasps breast, tongue down, lips flanged, rhythmic sucking  Audible Swallowing: Spontaneous and intermittent (24 hours old)  Type of Nipple: Everted (After stimulation)  Comfort (Breast/Nipple): Soft/non-tender  Hold (Positioning): No assist from staff, mother able to position/hold infant  LATCH Score: 10    Breast Pump  Pump: None    Other OB Lactation Tools  Lactation Tools: Lanolin    Patient  "Follow-up  Inpatient Lactation Follow-up Needed :  (as needed)  Outpatient Lactation Follow-up:  (as needed)    Other OB Lactation Documentation  Maternal Risk Factors: Other (comment) (previous use of supplementation)    Recommendations/Summary  37 y/o  experienced breastfeeding mother with vaginal delivery of  girl approximately 35 hours ago. Mother  her now 2 year old for approximately 3 months total, but began supplementing around 1 month. Mother states  had not gained enough weight at his one month visit and pediatrician had suggested supplementation. Mother plans \"to try\" breastfeeding longer with this  but is open to supplementation again if needed. Mother reports +breast changes during pregnancy and denies history of breast surgery. Mother states she has a pump at home and will return to work after January.     LC to bedside to assess breastfeeding progress and review education. Mother states that she believes  has started to cluster feed but is concerned that  is still hungry and may not be getting enough to eat. Reviewed with parents how to tell  is getting enough to eat at well as normal  feeding behaviors surrounding cluster feeding. Mother states understanding. Mother states she has some soreness with initially latching  but states this subsides within the first minute. Mother has Lanolin at the bedside. Mother also states she is feeling zelaya today.  beginning to wake in bassinet at this time and showing feeding cues. Encouraged mother to latch  should she wish to have a feed assessed prior to discharge. Mother agreeable. Mother independently latching  to the left breast in football hold with breast shaping.  eager to latch and deep latch quickly obtained. Deep latch observed with active sucking and swallowing and lips flanged. Mother states latch is comfortable. Encouraged mother to allow  to " continue nursing at each breast until  appears satiated. Reviewed signs of satiety. Mother states understanding.     Discharge education reviewed at this time. Reviewed benefits of breastfeeding, milk production, feeding cues and waking techniques. Reviewed signs of a deep and comfortable latch and how to tell  is eating adequately. Reviewed adequate intake and output. Reviewed cluster feeding and frequency of feeds. Reviewed when to begin pumping and cleaning of pump parts. Reviewed nipple care and how to prevent and treat engorgement, clogged ducts and mastitis. Encouraged mother to follow up with outpatient lactation. Resources provided. Ongoing assistance offered prior to discharge. Mother denies questions or concerns at this time.

## 2023-11-09 NOTE — DISCHARGE SUMMARY
Discharge Diagnosis  Term pregnancy  Vaginal Delivery   Acute on Chronic blood loss anemia  Thrombycytopenia    Issues Requiring Follow-Up  Birth control  6 week post-partum office visit    Hospital Course   38  s/p Vaginal Delivery on 23 with Dr. Ewing.  The patient tolerated the procedure well and there were no complications.  We managed her pain with scheduled acetaminophen and ibuprofen and encouraged her to ambulate frequently.  The patient was ambulating without difficulty and tolerating a regular diet therefore she was discharged home with instructions to follow-up with Dr. Ewing in six weeks.  Her vitals remained within normal limits.  Labs showed a mild blood loss anemia with H/H of 10.8/32.3 and thrombocytopenia with platelets at 127,000.    Pertinent Physical Exam At Time of Discharge  Gen: NAD  Eyes:  EOM intact  ENT: MMM  Neck: No JVD  Respiratory: CTAB, no wheezes/rhonchi  Cardiac: RRR, no murmurs rubs or gallops  Abdomen: soft, NT, +BS  Extremities: no edema or cyanosis  Neuro: No focal deficits, alert and oriented x 3  Psych:  appropriate mood and behavior    Home Medications  Flonase nasal spray   Ibuprofen 600 mg. 1 tablet q 6 hrs. As needed  Acetaminophen 500 mg. 2 tablets q 6 hrs. As needed  Miralax as needed    Outpatient Follow-Up  Instructed patient to call the office to schedule her 6 week follow-up appointment.       Dena Garcia PA-C

## 2023-11-09 NOTE — CARE PLAN
Problem: Postpartum  Goal: Experiences normal postpartum course  Outcome: Progressing     Problem: Postpartum  Goal: Appropriate maternal -  bonding  Outcome: Progressing     Problem: Postpartum  Goal: Establish and maintain infant feeding pattern for adequate nutrition  Outcome: Progressing     Problem: Postpartum  Goal: No s/sx of hemorrhage  Outcome: Progressing     Problem: Postpartum  Goal: Minimal s/sx of HDP and BP<160/110  Outcome: Progressing

## 2023-11-15 ENCOUNTER — TELEPHONE (OUTPATIENT)
Dept: OBSTETRICS AND GYNECOLOGY | Facility: HOSPITAL | Age: 38
End: 2023-11-15
Payer: COMMERCIAL

## 2023-11-15 NOTE — PROGRESS NOTES
Follow-Up Note    Care Type: Women's Health  Phone Number Called: [phone]    Call Outcome (connected, left message, no answer, phone disconnected): Left Message.    Patient reports feeling symptoms are (better, worse, same):     After returning home from the hospital, was it clear to you:     Which Meds were new Meds?   Which Meds to continue?   Which Meds to stop?   Who participated in medication reconciliation with the hospital staff (patient, family, other, no one):     To be answered by care coordinator or staff member doing call back:     In your professional opinion, do you think there was a medication discrepancy or potential for medication discrepancy in this situation?     Medication issues (none, confusion which medications to take, non-adherence to medication, prescription unfilled-inadequate funds, prescription unfilled-pharmacy will not fill (prescription unfilled-unable to get to pharmacy, troubled by side effects, other-see comments):     Discharge Instructions were clear:    Patient has a primary care provider:   Post-hospital follow-up occurred according to schedule:   Reason (appointment scheduled in future, appointment was never scheduled-encouraged patient to call, no appointment available within discharge plan, patient missed appointment):     Delivered baby(ies):     Chest Pain?:  SOB or difficulty breathing?:   Seizures?:    Any thoughts of hurting yourself or your baby?:  Bleeding that is soaking through one pad/hour or blood clots the size of an egg or larger?:  Incision that is not healing?    Red or swollen leg that is painful or warm to the touch?   Temperature of 100.4*F or higher?:  Headache that does not get better, even after taking medicine, or a bad headache with vision changes?:    Where or in what is your baby sleeping?  ABC's of sleep covered?     How are you feeding your baby(ies)-breast, EBM, formula, supplementation?:   Baby getting anything other than breastmilk or  formula for food?    Patient has Primary Care Provider for baby(ies)?   Baby has been seen by a health care provider since discharge?  If no, reason (appointment scheduled in the future, appointment was never scheduled, no appointment available within discharge plan, patient missed appointment):     Mom's Discharge Date: 11/9/2023  Date Baby was seen by provider:    Patient has specific name and number of who to call for concerns?:     Date/Time of Call: 11/15/2023 @ 1148  Call back done by (care coordinator, relationship based nurse, patient returned call, , lactation consultant, manager/supervisor, patient navigator, social work, other-see comments): Lactation Consultant      Comments:               Attempt Date 1: 11/15/2023  Call Attempt 1 outcome: Left Message.      Attempt Date 2: 11/22/2023  Call Attempt 2 outcome: Left Message.

## 2023-12-19 ENCOUNTER — POSTPARTUM VISIT (OUTPATIENT)
Dept: OBSTETRICS AND GYNECOLOGY | Facility: CLINIC | Age: 38
End: 2023-12-19
Payer: COMMERCIAL

## 2023-12-19 VITALS
WEIGHT: 146 LBS | SYSTOLIC BLOOD PRESSURE: 114 MMHG | BODY MASS INDEX: 26.87 KG/M2 | DIASTOLIC BLOOD PRESSURE: 76 MMHG | HEIGHT: 62 IN

## 2023-12-19 PROCEDURE — 0503F POSTPARTUM CARE VISIT: CPT | Performed by: OBSTETRICS & GYNECOLOGY

## 2023-12-19 NOTE — PROGRESS NOTES
Postpartum Progress Note    Assessment/Plan   Missy Paula is a 38 y.o., , who delivered at 39w0d gestation and is now postpartum day 42.    Vaginal delivery    Active Problems:  There are no active Hospital Problems.    Pregnancy Problems (from 23 to present)       Problem Noted Resolved    Nausea/vomiting in pregnancy 10/10/2023 by Juancho Sagastume No    Priority:  Medium      Term pregnancy 2023 by Brennen Ewing MD 2023 by Dena Garcia PA-C    Priority:  Medium            Hospital course: no complications       Subjective   Her pain is well controlled with current medications  She is passing flatus  She is ambulating well  She is tolerating a No diet orders on file  She reports no breast or nursing problems  She denies emotional concerns today   Her plan for contraception is IUD w/progesterone     Return for Mirena insertion.  Possibly 1 more child in the future    Objective   Allergies:   Patient has no known allergies.         Last Vitals:  Temp Pulse Resp BP MAP Pulse Ox         114/76         Vitals Min/Max Last 24 Hours:  @FLOWSTAT(6,8,9,5,[id number]:24::1)@    Intake/Output:   [unfilled]    Physical Exam:  General: Examination reveals a well developed, well nourished, female, in no acute distress. She is alert and cooperative.    Lab Data:  Lab Results   Component Value Date    WBC 13.6 (H) 2023    HGB 10.8 (L) 2023    HCT 32.3 (L) 2023     (L) 2023     Return for annual exam and Mirena insertion

## 2024-01-24 ENCOUNTER — OFFICE VISIT (OUTPATIENT)
Dept: OBSTETRICS AND GYNECOLOGY | Facility: CLINIC | Age: 39
End: 2024-01-24
Payer: COMMERCIAL

## 2024-01-24 VITALS
DIASTOLIC BLOOD PRESSURE: 84 MMHG | WEIGHT: 123 LBS | SYSTOLIC BLOOD PRESSURE: 126 MMHG | HEIGHT: 62 IN | BODY MASS INDEX: 22.63 KG/M2

## 2024-01-24 DIAGNOSIS — Z12.4 CERVICAL CANCER SCREENING: ICD-10-CM

## 2024-01-24 DIAGNOSIS — Z30.430 ENCOUNTER FOR IUD INSERTION: ICD-10-CM

## 2024-01-24 LAB
POC BLOOD, URINE: NEGATIVE
POC GLUCOSE, URINE: NEGATIVE MG/DL
POC LEUKOCYTES, URINE: NEGATIVE
POC NITRITE,URINE: NEGATIVE
POC PROTEIN, URINE: NEGATIVE MG/DL
PREGNANCY TEST URINE, POC: NEGATIVE

## 2024-01-24 PROCEDURE — 88175 CYTOPATH C/V AUTO FLUID REDO: CPT

## 2024-01-24 PROCEDURE — 99395 PREV VISIT EST AGE 18-39: CPT | Performed by: OBSTETRICS & GYNECOLOGY

## 2024-01-24 PROCEDURE — 87624 HPV HI-RISK TYP POOLED RSLT: CPT

## 2024-01-24 PROCEDURE — 1036F TOBACCO NON-USER: CPT | Performed by: OBSTETRICS & GYNECOLOGY

## 2024-01-24 PROCEDURE — 58300 INSERT INTRAUTERINE DEVICE: CPT | Performed by: OBSTETRICS & GYNECOLOGY

## 2024-01-24 PROCEDURE — 88141 CYTOPATH C/V INTERPRET: CPT | Performed by: PATHOLOGY

## 2024-01-24 PROCEDURE — 81025 URINE PREGNANCY TEST: CPT | Performed by: OBSTETRICS & GYNECOLOGY

## 2024-01-24 NOTE — PROGRESS NOTES
Subjective   Patient ID: Missy Paula is a 38 y.o. female who presents for Well woman ivist and IUD insertion.  Established patient for annual exam.  Breast-feeding.  No issues.  Supplementing.  Is for Mirena insertion.  Has had IUDs in the past.  Reviewed risks of infection expulsion perforation and need for follow-up visit in 6 to 8 weeks    On exam abdominal and pelvic exams normal Pap smear obtained.  Mirena placed.  Strings cut to 2 cm        Review of Systems   All other systems reviewed and are negative.      Objective   Physical Exam  Vitals reviewed.   Constitutional:       Appearance: Normal appearance. She is obese.   Genitourinary:     General: Normal vulva.      Vagina: Normal.      Cervix: Normal.      Uterus: Normal.       Adnexa: Right adnexa normal and left adnexa normal.   Neurological:      Mental Status: She is alert.         Assessment/Plan   Well woman exam.  Pap smear obtained.  Mirena placed         Brennen Ewing MD 01/24/24 11:32 AM

## 2024-01-24 NOTE — PROGRESS NOTES
Patient ID: Missy Paula is a 38 y.o. female.    IUD Insertion    Date/Time: 1/24/2024 11:23 AM    Performed by: Brennen Ewing MD  Authorized by: Brennen Ewing MD    Consent:     Consent obtained:  Verbal    Consent given by:  Patient    Procedure risks and benefits discussed: yes      Patient questions answered: yes      Patient agrees, verbalizes understanding, and wants to proceed: yes    Procedure:     Pelvic exam performed: yes      Negative urine pregnancy test: yes      Cervix cleaned and prepped: yes      Speculum placed in vagina: yes      Tenaculum applied to cervix: yes      Uterus sounded: yes      Uterus sound depth (cm):  7    IUD type:  Mirena    Strings trimmed: yes    Post-procedure:     Patient tolerated procedure well: yes      Patient will follow up after next period: yes    Subjective   Patient ID: Missy Paula is a 38 y.o. female who presents for Well woman ivist and IUD insertion.  HPI    Review of Systems    Objective   Physical Exam    Assessment/Plan            Brennen Ewing MD 01/24/24 11:22 AM

## 2024-02-07 DIAGNOSIS — Z30.430 ENCOUNTER FOR INSERTION OF INTRAUTERINE CONTRACEPTIVE DEVICE (IUD): ICD-10-CM

## 2024-02-08 LAB
CYTOLOGY CMNT CVX/VAG CYTO-IMP: NORMAL
HPV HR 12 DNA GENITAL QL NAA+PROBE: POSITIVE
HPV HR GENOTYPES PNL CVX NAA+PROBE: POSITIVE
HPV16 DNA SPEC QL NAA+PROBE: NEGATIVE
HPV18 DNA SPEC QL NAA+PROBE: NEGATIVE
LAB AP HPV GENOTYPE QUESTION: YES
LAB AP HPV HR: NORMAL
LABORATORY COMMENT REPORT: NORMAL
LMP START DATE: NORMAL
MENSTRUAL HX REPORTED: NORMAL
PATH REPORT.TOTAL CANCER: NORMAL

## 2024-02-12 ENCOUNTER — TELEPHONE (OUTPATIENT)
Dept: OBSTETRICS AND GYNECOLOGY | Facility: CLINIC | Age: 39
End: 2024-02-12
Payer: COMMERCIAL

## 2024-02-27 ENCOUNTER — PROCEDURE VISIT (OUTPATIENT)
Dept: OBSTETRICS AND GYNECOLOGY | Facility: CLINIC | Age: 39
End: 2024-02-27
Payer: COMMERCIAL

## 2024-02-27 VITALS
BODY MASS INDEX: 26.87 KG/M2 | WEIGHT: 146 LBS | HEIGHT: 62 IN | DIASTOLIC BLOOD PRESSURE: 66 MMHG | SYSTOLIC BLOOD PRESSURE: 108 MMHG

## 2024-02-27 DIAGNOSIS — R87.612 LGSIL ON PAP SMEAR OF CERVIX: Primary | ICD-10-CM

## 2024-02-27 PROCEDURE — 87624 HPV HI-RISK TYP POOLED RSLT: CPT

## 2024-02-27 PROCEDURE — 57456 ENDOCERV CURETTAGE W/SCOPE: CPT | Performed by: OBSTETRICS & GYNECOLOGY

## 2024-02-27 PROCEDURE — 88175 CYTOPATH C/V AUTO FLUID REDO: CPT

## 2024-02-27 PROCEDURE — 88141 CYTOPATH C/V INTERPRET: CPT | Performed by: STUDENT IN AN ORGANIZED HEALTH CARE EDUCATION/TRAINING PROGRAM

## 2024-02-27 NOTE — ADDENDUM NOTE
Addended by: MARTA HOLLINGSWORTH on: 2/27/2024 03:29 PM     Modules accepted: Orders     BG goal 140-180  No previous hx of T2DM per family at bedside. A1c of 10.4. DKA at OSH. TF on. BG stable on regimen.      Plan:  - Continue Levemir 14 units daily.  - Continue Novolog 6 units q 4 hrs while on tube feeding (HOLD if tube feeding is stopped or BG < 100)   - Continue Moderate Dose Correction Scale  - BG monitoring q 4 hrs while NPO /TF    ** Please call Endocrine for any BG related issues **      Discharge plans: TBD    Lab Results   Component Value Date    HGBA1C 10.4 (H) 01/30/2024

## 2024-02-27 NOTE — PROGRESS NOTES
Patient ID: Missy Paula is a 38 y.o. female.    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

## 2024-03-12 LAB
CYTOLOGY CMNT CVX/VAG CYTO-IMP: NORMAL
HPV HR 12 DNA GENITAL QL NAA+PROBE: POSITIVE
HPV HR GENOTYPES PNL CVX NAA+PROBE: POSITIVE
HPV16 DNA SPEC QL NAA+PROBE: NEGATIVE
HPV18 DNA SPEC QL NAA+PROBE: NEGATIVE
LAB AP CONTRACEPTIVE HISTORY: NORMAL
LAB AP HPV GENOTYPE QUESTION: YES
LAB AP HPV HR: NORMAL
LABORATORY COMMENT REPORT: NORMAL
LMP START DATE: NORMAL
PATH REPORT.TOTAL CANCER: NORMAL

## 2024-03-13 ENCOUNTER — OFFICE VISIT (OUTPATIENT)
Dept: OBSTETRICS AND GYNECOLOGY | Facility: CLINIC | Age: 39
End: 2024-03-13
Payer: COMMERCIAL

## 2024-03-13 VITALS
HEIGHT: 62 IN | BODY MASS INDEX: 26.31 KG/M2 | WEIGHT: 143 LBS | SYSTOLIC BLOOD PRESSURE: 116 MMHG | DIASTOLIC BLOOD PRESSURE: 70 MMHG

## 2024-03-13 DIAGNOSIS — N93.9 VAGINAL BLEEDING: Primary | ICD-10-CM

## 2024-03-13 DIAGNOSIS — Z30.431 IUD CHECK UP: ICD-10-CM

## 2024-03-13 PROCEDURE — 76830 TRANSVAGINAL US NON-OB: CPT | Performed by: OBSTETRICS & GYNECOLOGY

## 2024-03-13 PROCEDURE — 1036F TOBACCO NON-USER: CPT | Performed by: OBSTETRICS & GYNECOLOGY

## 2024-03-13 PROCEDURE — 99213 OFFICE O/P EST LOW 20 MIN: CPT | Performed by: OBSTETRICS & GYNECOLOGY

## 2024-03-13 NOTE — PROGRESS NOTES
Subjective   Patient ID: Missy Paula is a 38 y.o. female who presents for IUD check.  Follow-up after IUD insertion.  She stopped breast-feeding having some off-and-on bleeding varying in amount.  Abdominal and pelvic exam along with ultrasound shows IUD in position.  I discussed that this is likely hormonal bleeding causing shedding of the endometrium.  Eventually it should improve and then bleeding tends to become very light or [place]    He had a recent abnormal Pap smear we did colposcopy.  Reviewed endocervical curettings which showed positive high risk HPV and LGSIL.  Recommend repeat Pap smear in 1 year        Review of Systems   Genitourinary:  Positive for vaginal bleeding.       Objective   Physical Exam  Constitutional:       Appearance: Normal appearance. She is normal weight.   Genitourinary:     General: Normal vulva.      Vagina: Normal.      Cervix: Normal.      Uterus: Normal.    Neurological:      Mental Status: She is alert.         Assessment/Plan   IUD in place.  Bleeding likely related to progesterone effect.  Colposcopy findings reviewed.  Repeat Pap 1 year         Brennen Ewing MD 03/13/24 1:24 PM

## 2025-01-28 ENCOUNTER — APPOINTMENT (OUTPATIENT)
Dept: OBSTETRICS AND GYNECOLOGY | Facility: CLINIC | Age: 40
End: 2025-01-28
Payer: COMMERCIAL

## 2025-02-05 ENCOUNTER — APPOINTMENT (OUTPATIENT)
Dept: OBSTETRICS AND GYNECOLOGY | Facility: CLINIC | Age: 40
End: 2025-02-05
Payer: COMMERCIAL

## 2025-02-05 VITALS
SYSTOLIC BLOOD PRESSURE: 106 MMHG | BODY MASS INDEX: 27.79 KG/M2 | DIASTOLIC BLOOD PRESSURE: 66 MMHG | WEIGHT: 151 LBS | HEIGHT: 62 IN

## 2025-02-05 DIAGNOSIS — Z12.4 CERVICAL CANCER SCREENING: ICD-10-CM

## 2025-02-05 DIAGNOSIS — Z01.419 WELL WOMAN EXAM: Primary | ICD-10-CM

## 2025-02-05 PROCEDURE — 1036F TOBACCO NON-USER: CPT | Performed by: OBSTETRICS & GYNECOLOGY

## 2025-02-05 PROCEDURE — 99395 PREV VISIT EST AGE 18-39: CPT | Performed by: OBSTETRICS & GYNECOLOGY

## 2025-02-05 PROCEDURE — 88175 CYTOPATH C/V AUTO FLUID REDO: CPT

## 2025-02-05 PROCEDURE — 3008F BODY MASS INDEX DOCD: CPT | Performed by: OBSTETRICS & GYNECOLOGY

## 2025-02-05 PROCEDURE — 88141 CYTOPATH C/V INTERPRET: CPT | Performed by: PATHOLOGY

## 2025-02-05 PROCEDURE — 87624 HPV HI-RISK TYP POOLED RSLT: CPT

## 2025-02-05 RX ORDER — LEVONORGESTREL 52 MG/1
1 INTRAUTERINE DEVICE INTRAUTERINE ONCE
COMMUNITY

## 2025-03-25 ENCOUNTER — APPOINTMENT (OUTPATIENT)
Dept: OBSTETRICS AND GYNECOLOGY | Facility: CLINIC | Age: 40
End: 2025-03-25
Payer: COMMERCIAL

## 2025-04-01 ENCOUNTER — APPOINTMENT (OUTPATIENT)
Dept: OBSTETRICS AND GYNECOLOGY | Facility: CLINIC | Age: 40
End: 2025-04-01
Payer: COMMERCIAL

## 2025-04-01 VITALS
SYSTOLIC BLOOD PRESSURE: 116 MMHG | BODY MASS INDEX: 27.97 KG/M2 | WEIGHT: 152 LBS | DIASTOLIC BLOOD PRESSURE: 72 MMHG | HEIGHT: 62 IN

## 2025-04-01 DIAGNOSIS — R87.810 CERVICAL HIGH RISK HPV (HUMAN PAPILLOMAVIRUS) TEST POSITIVE: ICD-10-CM

## 2025-04-01 DIAGNOSIS — R87.611 PAP SMEAR OF CERVIX WITH ASCUS, CANNOT EXCLUDE HGSIL: Primary | ICD-10-CM

## 2025-04-01 PROCEDURE — 87624 HPV HI-RISK TYP POOLED RSLT: CPT

## 2025-04-01 PROCEDURE — 57456 ENDOCERV CURETTAGE W/SCOPE: CPT | Performed by: OBSTETRICS & GYNECOLOGY

## 2025-04-01 PROCEDURE — 88175 CYTOPATH C/V AUTO FLUID REDO: CPT

## 2025-04-01 PROCEDURE — 3008F BODY MASS INDEX DOCD: CPT | Performed by: OBSTETRICS & GYNECOLOGY

## 2025-04-01 PROCEDURE — 88141 CYTOPATH C/V INTERPRET: CPT | Performed by: PATHOLOGY

## 2025-04-01 NOTE — PROGRESS NOTES
Patient ID: Missy Paula is a 39 y.o. female.    Colposcopy    Date/Time: 4/1/2025 11:04 AM    Performed by: Brennen Ewing MD  Authorized by: Brennen Ewing MD    Procedure location: cervix    Consent:     Patient questions answered: yes      Risks and benefits of the procedure and its alternatives discussed: yes      Consent given by:  Patient  Indication:     Cervical indication(s): high-risk HPV positive and ASC-H    Pre-procedure:     Prep solution(s): acetic acid    Procedure:     Colposcopy with: endocervical curettage      Colposcopy details:  Mild acetowhite epithelium at 6-7 o'clock with irregular borders.    Cervix visibility: fully visualized      SCJ visibility: fully visualized      Acetowhite lesion(s): cervix      Acetowhite lesion(s) description:  Acetowhite epithelium with irregular borders and some fine mosaicism at 6-7 o'clock    Cervical impression: low grade    Post-procedure:     Patient tolerance of procedure:  Patient tolerated the procedure well with no immediate complications    Instructions and paperwork completed: yes      Educational handouts given: yes    Comments:      Endocervical curetting sent.  Will await results.  Discussed possibility of LEEP procedure.  If so can remove IUD at the same time.  If no evidence of high-grade dysplasia return for IUD removal as she would like to conceive

## 2025-04-10 LAB
CYTOLOGY CMNT CVX/VAG CYTO-IMP: NORMAL
HPV HR 12 DNA GENITAL QL NAA+PROBE: POSITIVE
HPV HR GENOTYPES PNL CVX NAA+PROBE: POSITIVE
HPV16 DNA SPEC QL NAA+PROBE: NEGATIVE
HPV18 DNA SPEC QL NAA+PROBE: NEGATIVE
LAB AP CONTRACEPTIVE HISTORY: NORMAL
LAB AP HPV GENOTYPE QUESTION: YES
LAB AP HPV HR: NORMAL
LABORATORY COMMENT REPORT: NORMAL
LMP START DATE: NORMAL
PATH REPORT.TOTAL CANCER: NORMAL
RESIDENT REVIEW: NORMAL

## 2025-04-18 ENCOUNTER — APPOINTMENT (OUTPATIENT)
Dept: OBSTETRICS AND GYNECOLOGY | Facility: CLINIC | Age: 40
End: 2025-04-18
Payer: COMMERCIAL

## 2025-04-18 ENCOUNTER — PREP FOR PROCEDURE (OUTPATIENT)
Dept: OBSTETRICS AND GYNECOLOGY | Facility: HOSPITAL | Age: 40
End: 2025-04-18

## 2025-04-18 VITALS
SYSTOLIC BLOOD PRESSURE: 102 MMHG | BODY MASS INDEX: 27.79 KG/M2 | DIASTOLIC BLOOD PRESSURE: 64 MMHG | HEIGHT: 62 IN | WEIGHT: 151 LBS

## 2025-04-18 DIAGNOSIS — Z30.432 ENCOUNTER FOR IUD REMOVAL: ICD-10-CM

## 2025-04-18 DIAGNOSIS — R87.611 ATYPICAL SQUAMOUS CELLS CANNOT EXCLUDE HIGH GRADE SQUAMOUS INTRAEPITHELIAL LESION ON CYTOLOGIC SMEAR OF CERVIX (ASC-H): Primary | ICD-10-CM

## 2025-04-18 PROCEDURE — 3008F BODY MASS INDEX DOCD: CPT | Performed by: OBSTETRICS & GYNECOLOGY

## 2025-04-18 PROCEDURE — 99214 OFFICE O/P EST MOD 30 MIN: CPT | Performed by: OBSTETRICS & GYNECOLOGY

## 2025-04-18 RX ORDER — CELECOXIB 200 MG/1
400 CAPSULE ORAL ONCE
OUTPATIENT
Start: 2025-04-18 | End: 2025-04-18

## 2025-04-18 RX ORDER — GABAPENTIN 600 MG/1
600 TABLET ORAL ONCE
OUTPATIENT
Start: 2025-04-18 | End: 2025-04-18

## 2025-04-18 RX ORDER — ACETAMINOPHEN 325 MG/1
975 TABLET ORAL ONCE
OUTPATIENT
Start: 2025-04-18 | End: 2025-04-18

## 2025-04-18 NOTE — PROGRESS NOTES
Subjective   Patient ID: Missy Paula is a 39 y.o. female who presents for Consult.  Recent abnormal Pap smear colposcopy cannot rule out high-grade dysplasia.  Would like IUD removed for the purpose of conceiving.  We can organize outpatient LEEP as well as IUD removal at the same time.  Reviewed procedure.  Avoid intercourse for 1 month and then safe to start trying to conceive.  Take prenatal vitamins.    Results   HPV DNA High Risk With Genotype (Order [id number])      HPV DNA High Risk With Genotype (Order [id number]) - Reflex for Order [id number]     THINPREP PAP TEST (Order [id number])      HPV DNA High Risk With Genotype (Order [id number]) - Reflex for Order [id number]  All Reviewers List    Brennen Ewing MD on 4/13/2025 20:03     THINPREP PAP TEST (Order [id number])      All Reviewers List    Brennen Ewing MD on 4/13/2025 20:03      HPV DNA High Risk With Genotype (Order [id number]) - Reflex for Order [id number]      Lab Information    Lab  [facility]          THINPREP PAP TEST (Order [id number])      Lab Information    Lab  [facility]    Responsible Users  LUDY Huerta Cytotechnologist  Kalyn Xavier MD Staff Pathologist          Contains abnormal data HPV DNA High Risk With Genotype: [id number]  Order: [id number] - Reflex for Order [id number]   Status: Final result         Next appt: None         Dx: Pap smear of cervix with ASCUS, canno...      Test Result Released: Yes (seen)      0 Result Notes         View Follow-Up Encounter         1  Topic         Component  Ref Range & Units   HPV, high-risk  Negative Positive Abnormal   HPV Type 16 DNA  Negative Negative  HPV Type 18 DNA  Negative Negative  HPV non-Type 16 or 18 DNA  Negative Positive Abnormal   Resulting Agency [facility]           Narrative  Performed by: [facility]   Testing for high-risk (HR) types of human papilloma virus (HPV) is performed by the Roche cristina HPV Test. The cristina HPV Test is a qualitative polymerase chain reaction that amplifies DNA  of HPV16, HPV18, and 12 other high-risk HPV types (31, 33, 35, 39, 45, 51, 52, 56, 58, 59, 66, and 68) associated with cervical cancer and its precursor lesions. A positive result indicates the presence of HPV DNA due to one or more of the 14 genotypes: 16, 18, 31, 33, 35, 39, 45, 51, 52, 56, 58, 59, 66, and 68. Negative results indicates HPV DNA concentrations are undectectable or below the pre-set threshold for detection. False negative results may be associated with unoptimized sampling. A negative HR HPV result does not exclude the possibility of future cytologic HSIL or underlying CIN2-3 or cancer.   This test is approved by the US Food and Drug Administration. Results of this test should be interpreted in conjunction with the patient Pap test results. Please refer to ASCCP current quidelines for the use of HPV DNA testing, result interpretation, and patient management.   The performance of this test was verified by the [facility] at [facility]. The lab is certified under the Clinical Laboratory Amendments of 1988 (CLIA 88) as qualified to perform high complexity clinical laboratory testing.    PERFORMING LAB LOCATIONS  [facility]: [facility] [place] [address]  Specimen Collected: 04/01/25 11:24 Last Resulted: 04/10/25 11:37      Order Details          View Encounter          Lab and Collection Details          Routing          Result History      View All Conversations on this Encounter    Result Care Coordination      Patient Communication     Add Comments   Seen Back to Top       Encounters Related to Results    4/13/2025 Patient Message  4/1/2025 Office Visit (Ordering Encounter) Back to Top       Satisfied Health Maintenance Topics     Back to Top  Cervical Cancer Screening (HPV/Cotest - Every 5 Years)  Next due on 4/1/2030  Address Topic  THINPREP PAP TEST: [id number]  Order: [id number]   Status: Final result         Next appt: None         Dx: Pap  smear of cervix with ASCUS, canno...      Test Result Released: Yes (seen)      0 Result Notes         View Follow-Up Encounter         1  Topic         Component   Case Report  Gynecologic Cytology                              Case: [id number]                                  Authorizing Provider:  Brennen Ewing MD            Collected:           04/01/2025 1124              Ordering Location:     [facility]   Received:            04/01/2025 1124                                     Center                                                                      First Screen:          LUDY Huerta                                                              Pathologist:           Kalyn Xavier MD                                                        Specimen:    ThinPrep Liquid-Based Pap-Imaging System Screen, ECTO/ENDOCERVIX/VAGINA, DIAGNOSTIC      Final Cytological Interpretation  Squamous and/or Glandular Abnormality     A. THINPREP PAP ECTO/ENDOCERVIX/VAGINA, DIAGNOSTIC -      Specimen Adequacy  Satisfactory for evaluation; endocervical/transformation zone component is present     General Categorization  Epithelial cell abnormality- squamous cell, see interpretation.     Descriptive Interpretation  Atypical squamous cells cannot exclude high grade squamous intraepithelial lesion (ASC-H) - Cervix           Electronically signed by Kalyn Xavier MD on 4/10/2025 at 1137      Slide(s) initially screened by LUDY PARADA at [facility] [id number] [place] [id number]  By the signature on this report, the individual or group listed as making the Final Interpretation/Diagnosis certifies that they have reviewed this case.     Resident Review   Mary Harper MD                ThinPrep Imaging System   This specimen has been analyzed by the ThinPrep Imaging System ([employer] an automated imaging and review system, which assists the laboratory in evaluating cells  on ThinPrep Pap tests. Following automated imaging, selected fields from every slide were reviewed by a cytotechnologist and/or pathologist.       Educational Note   Cervical cytology is a screening procedure primarily for squamous cancers and precursors and has associated false-negative and false-positives results as evidenced by published data. Your patient's test should be interpreted in this context, together with the patient's history and clinical findings. Regular sampling and follow-up of unexplained clinical signs and symptoms are recommended to minimize false negative results.    Perform HPV HR test? Always (all interpretations)  Include HPV Genotype? Yes  LMP   Comment:  Mirena IUD no cycles     Contraceptive History   IUD  Comment: Mirena IUD    Resulting Agency [facility]          Specimen Collected: 04/01/25 11:24 Last Resulted: 04/10/25 11:37            Review of Systems   All other systems reviewed and are negative.      Objective   Physical Exam  Constitutional:       Appearance: Normal appearance. She is normal weight.   Neurological:      Mental Status: She is alert.         Assessment/Plan   Abnormal Pap smear cannot rule out high-grade dysplasia.  Had colposcopy.  Also for IUD removal at the same time.  Plan is LEEP with IUD removal.  Reviewed surgery risk benefits complications recovery         Brennen Ewing MD 04/18/25 10:33 AM

## 2025-04-21 ENCOUNTER — ANESTHESIA EVENT (OUTPATIENT)
Dept: OPERATING ROOM | Facility: HOSPITAL | Age: 40
End: 2025-04-21
Payer: COMMERCIAL

## 2025-04-23 RX ORDER — OXYCODONE HYDROCHLORIDE 5 MG/1
5 TABLET ORAL EVERY 4 HOURS PRN
Refills: 0 | Status: CANCELLED | OUTPATIENT
Start: 2025-04-23

## 2025-04-23 RX ORDER — DROPERIDOL 2.5 MG/ML
0.62 INJECTION, SOLUTION INTRAMUSCULAR; INTRAVENOUS ONCE AS NEEDED
Status: CANCELLED | OUTPATIENT
Start: 2025-04-23

## 2025-04-23 RX ORDER — MEPERIDINE HYDROCHLORIDE 25 MG/ML
12.5 INJECTION INTRAMUSCULAR; INTRAVENOUS; SUBCUTANEOUS EVERY 10 MIN PRN
Status: CANCELLED | OUTPATIENT
Start: 2025-04-23

## 2025-04-23 RX ORDER — SODIUM CHLORIDE, SODIUM LACTATE, POTASSIUM CHLORIDE, CALCIUM CHLORIDE 600; 310; 30; 20 MG/100ML; MG/100ML; MG/100ML; MG/100ML
100 INJECTION, SOLUTION INTRAVENOUS CONTINUOUS
Status: CANCELLED | OUTPATIENT
Start: 2025-04-23 | End: 2025-04-24

## 2025-04-23 RX ORDER — DIPHENHYDRAMINE HYDROCHLORIDE 50 MG/ML
12.5 INJECTION, SOLUTION INTRAMUSCULAR; INTRAVENOUS ONCE AS NEEDED
Status: CANCELLED | OUTPATIENT
Start: 2025-04-23

## 2025-04-23 RX ORDER — ALBUTEROL SULFATE 0.83 MG/ML
2.5 SOLUTION RESPIRATORY (INHALATION) ONCE AS NEEDED
Status: CANCELLED | OUTPATIENT
Start: 2025-04-23

## 2025-04-23 RX ORDER — ONDANSETRON HYDROCHLORIDE 2 MG/ML
4 INJECTION, SOLUTION INTRAVENOUS ONCE AS NEEDED
Status: CANCELLED | OUTPATIENT
Start: 2025-04-23

## 2025-04-23 NOTE — H&P
History Of Present Illness  Missy Paula is a 39 y.o. female presenting with    Brennen Ewing MD  Physician  Obstetrics     Progress Notes     Signed     Encounter Date: 4/18/2025     Signed       Expand All Collapse All    Subjective  Patient ID: Missy Paula is a 39 y.o. female who presents for Consult.  Recent abnormal Pap smear colposcopy cannot rule out high-grade dysplasia.  Would like IUD removed for the purpose of conceiving.  We can organize outpatient LEEP as well as IUD removal at the same time.  Reviewed procedure.  Avoid intercourse for 1 month and then safe to start trying to conceive.  Take prenatal vitamins.     Results   HPV DNA High Risk With Genotype (Order [id number])      HPV DNA High Risk With Genotype (Order [id number]) - Reflex for Order [id number]     THINPREP PAP TEST (Order [id number])      HPV DNA High Risk With Genotype (Order [id number]) - Reflex for Order [id number]  All Reviewers List     Brennen Ewing MD on 4/13/2025 20:03     THINPREP PAP TEST (Order [id number])        All Reviewers List     Brennen Ewing MD on 4/13/2025 20:03      HPV DNA High Risk With Genotype (Order [id number]) - Reflex for Order [id number]        Lab Information     Lab  [facility]           THINPREP PAP TEST (Order [id number])        Lab Information     Lab  [facility]     Responsible Users  LUDY Huerta Cytotechnologist  Kalyn Xavier MD Staff Pathologist                               Contains abnormal data HPV DNA High Risk With Genotype: [id number]  Order: [id number] - Reflex for Order [id number]   Status: Final result          Next appt: None          Dx: Pap smear of cervix with ASCUS, canno...       Test Result Released: Yes (seen)       0 Result Notes          View Follow-Up Encounter          1  Topic                      Component  Ref Range & Units         HPV, high-risk  Negative          Positive Abnormal   HPV Type 16 DNA  NegativeNegative  HPV Type 18 DNA  NegativeNegative  HPV non-Type 16 or  18 DNA  Negative          Positive Abnormal   Resulting [facility]              Narrative  Performed by: [facility]   Testing for high-risk (HR) types of human papilloma virus (HPV) is performed by the Roche cristina HPV Test. The cristina HPV Test is a qualitative polymerase chain reaction that amplifies DNA of HPV16, HPV18, and 12 other high-risk HPV types (31, 33, 35, 39, 45, 51, 52, 56, 58, 59, 66, and 68) associated with cervical cancer and its precursor lesions. A positive result indicates the presence of HPV DNA due to one or more of the 14 genotypes: 16, 18, 31, 33, 35, 39, 45, 51, 52, 56, 58, 59, 66, and 68. Negative results indicates HPV DNA concentrations are undectectable or below the pre-set threshold for detection. False negative results may be associated with unoptimized sampling. A negative HR HPV result does not exclude the possibility of future cytologic HSIL or underlying CIN2-3 or cancer.   This test is approved by the US Food and Drug Administration. Results of this test should be interpreted in conjunction with the patient Pap test results. Please refer to ASCCP current quidelines for the use of HPV DNA testing, result interpretation, and patient management.   The performance of this test was verified by the [facility] at [facility]. The lab is certified under the Clinical Laboratory Amendments of 1988 (CLIA 88) as qualified to perform high complexity clinical laboratory testing.     PERFORMING LAB LOCATIONS  [facility]: [address] [place] [address]  Specimen Collected: 04/01/25 11:24Last Resulted: 04/10/25 11:37      Order Details           View Encounter           Lab and Collection Details           Routing           Result History       View All Conversations on this Encounter    Result Care Coordination        Patient Communication      Add Comments           Seen    Back to Top        Encounters Related to Results     4/13/2025 Patient  Message  4/1/2025 Office Visit (Ordering Encounter)Back to Top        Satisfied Health Maintenance Topics      Back to Top  Cervical Cancer Screening (HPV/Cotest - Every 5 Years)                    Next due on 4/1/2030             Address Topic  THINPREP PAP TEST: [id number]  Order: [id number]   Status: Final result          Next appt: None          Dx: Pap smear of cervix with ASCUS, canno...       Test Result Released: Yes (seen)       0 Result Notes          View Follow-Up Encounter          1  Topic                      Component       Case Report  Gynecologic Cytology                              Case: [id number]                                  Authorizing Provider:  Brennen Ewing MD            Collected:           04/01/2025 1124              Ordering Location:     [facility]   Received:            04/01/2025 1124                                     Center                                                                      First Screen:          LUDY Huerta                                                              Pathologist:           Kalyn Xavier MD                                                        Specimen:    ThinPrep Liquid-Based Pap-Imaging System Screen, ECTO/ENDOCERVIX/VAGINA, DIAGNOSTIC       Final Cytological Interpretation  Squamous and/or Glandular Abnormality     A. THINPREP PAP ECTO/ENDOCERVIX/VAGINA, DIAGNOSTIC -      Specimen Adequacy  Satisfactory for evaluation; endocervical/transformation zone component is present     General Categorization  Epithelial cell abnormality- squamous cell, see interpretation.     Descriptive Interpretation  Atypical squamous cells cannot exclude high grade squamous intraepithelial lesion (ASC-H) - Cervix           Electronically signed by Kalyn Xavier MD on 4/10/2025 at 1137                Slide(s) initially screened by LUDY PARADA at [facility] [address] [facility] [phone]  By the signature on  this report, the individual or group listed as making the Final Interpretation/Diagnosis certifies that they have reviewed this case.      Resident Review            Mary Harper MD                 ThinPrep Imaging System         This specimen has been analyzed by the ThinPrep Imaging System ([employer] an automated imaging and review system, which assists the laboratory in evaluating cells on ThinPrep Pap tests. Following automated imaging, selected fields from every slide were reviewed by a cytotechnologist and/or pathologist.        Educational Note           Cervical cytology is a screening procedure primarily for squamous cancers and precursors and has associated false-negative and false-positives results as evidenced by published data. Your patient's test should be interpreted in this context, together with the patient's history and clinical findings. Regular sampling and follow-up of unexplained clinical signs and symptoms are recommended to minimize false negative results.     Perform HPV HR test?Always (all interpretations)  Include HPV Genotype?Yes  LMP       Comment:  Mirena IUD no cycles     Contraceptive History    IUD  Comment: Mirena IUD     Resulting [facility]              Specimen Collected: 04/01/25 11:24Last Resulted: 04/10/25 11:37                 Review of Systems   All other systems reviewed and are negative.        Objective  Physical Exam  Constitutional:       Appearance: Normal appearance. She is normal weight.   Neurological:      Mental Status: She is alert.            Assessment/Plan  Abnormal Pap smear cannot rule out high-grade dysplasia.  Had colposcopy.  Also for IUD removal at the same time.  Plan is LEEP with IUD removal.  Reviewed surgery risk benefits complications recovery        Brennen Ewing MD 04/18/25 10:33 AM             Electronically signed by Brennen Ewing MD at 4/18/2025 10:34 AM.     Past Medical History  Medical History[1]    Surgical History  Surgical  History[2]     Social History  She reports that she has never smoked. She has never used smokeless tobacco. She reports that she does not drink alcohol and does not use drugs.    Family History  Family History[3]     Allergies  Patient has no known allergies.    Review of Systems     Physical Exam     Last Recorded Vitals  not currently breastfeeding.    Relevant Results             Assessment & Plan  Atypical squamous cells cannot exclude high grade squamous intraepithelial lesion on cytologic smear of cervix (ASC-H)    Encounter for IUD removal      For LEEP with removal of IUD    I spent 10 minutes in the professional and overall care of this patient.      Brennen Ewing MD         [1]   Past Medical History:  Diagnosis Date    Amenorrhea     Atypical squamous cells cannot exclude high grade squamous intraepithelial lesion on cytologic smear of cervix (ASC-H)     Chronic anal fissure     Encounter for IUD removal     Vaginal delivery ([facility]) 11/07/2023   [2]   Past Surgical History:  Procedure Laterality Date    OTHER SURGICAL HISTORY  10/25/2022    Ovarian cystectomy    PELVIC LAPAROSCOPY      11/2012   [3] No family history on file.

## 2025-04-24 ENCOUNTER — HOSPITAL ENCOUNTER (OUTPATIENT)
Facility: HOSPITAL | Age: 40
Setting detail: OUTPATIENT SURGERY
Discharge: HOME | End: 2025-04-24
Attending: OBSTETRICS & GYNECOLOGY | Admitting: OBSTETRICS & GYNECOLOGY
Payer: COMMERCIAL

## 2025-04-24 ENCOUNTER — ANESTHESIA (OUTPATIENT)
Dept: OPERATING ROOM | Facility: HOSPITAL | Age: 40
End: 2025-04-24
Payer: COMMERCIAL

## 2025-04-24 VITALS
SYSTOLIC BLOOD PRESSURE: 118 MMHG | RESPIRATION RATE: 15 BRPM | DIASTOLIC BLOOD PRESSURE: 54 MMHG | HEIGHT: 62 IN | HEART RATE: 60 BPM | TEMPERATURE: 98.1 F | OXYGEN SATURATION: 100 % | WEIGHT: 149.91 LBS | BODY MASS INDEX: 27.59 KG/M2

## 2025-04-24 DIAGNOSIS — Z30.432 ENCOUNTER FOR IUD REMOVAL: ICD-10-CM

## 2025-04-24 DIAGNOSIS — R87.611 ATYPICAL SQUAMOUS CELLS CANNOT EXCLUDE HIGH GRADE SQUAMOUS INTRAEPITHELIAL LESION ON CYTOLOGIC SMEAR OF CERVIX (ASC-H): Primary | ICD-10-CM

## 2025-04-24 LAB — PREGNANCY TEST URINE, POC: NEGATIVE

## 2025-04-24 PROCEDURE — 3600000003 HC OR TIME - INITIAL BASE CHARGE - PROCEDURE LEVEL THREE: Performed by: OBSTETRICS & GYNECOLOGY

## 2025-04-24 PROCEDURE — 7100000010 HC PHASE TWO TIME - EACH INCREMENTAL 1 MINUTE: Performed by: OBSTETRICS & GYNECOLOGY

## 2025-04-24 PROCEDURE — 3700000001 HC GENERAL ANESTHESIA TIME - INITIAL BASE CHARGE: Performed by: OBSTETRICS & GYNECOLOGY

## 2025-04-24 PROCEDURE — A58301 PR REMOVE INTRAUTERINE DEVICE: Performed by: ANESTHESIOLOGY

## 2025-04-24 PROCEDURE — 3600000008 HC OR TIME - EACH INCREMENTAL 1 MINUTE - PROCEDURE LEVEL THREE: Performed by: OBSTETRICS & GYNECOLOGY

## 2025-04-24 PROCEDURE — 7100000002 HC RECOVERY ROOM TIME - EACH INCREMENTAL 1 MINUTE: Performed by: OBSTETRICS & GYNECOLOGY

## 2025-04-24 PROCEDURE — 2500000004 HC RX 250 GENERAL PHARMACY W/ HCPCS (ALT 636 FOR OP/ED): Mod: JZ

## 2025-04-24 PROCEDURE — 88307 TISSUE EXAM BY PATHOLOGIST: CPT | Mod: TC,GEALAB | Performed by: OBSTETRICS & GYNECOLOGY

## 2025-04-24 PROCEDURE — 7100000009 HC PHASE TWO TIME - INITIAL BASE CHARGE: Performed by: OBSTETRICS & GYNECOLOGY

## 2025-04-24 PROCEDURE — 58301 REMOVE INTRAUTERINE DEVICE: CPT | Performed by: OBSTETRICS & GYNECOLOGY

## 2025-04-24 PROCEDURE — 57522 CONIZATION OF CERVIX: CPT | Performed by: OBSTETRICS & GYNECOLOGY

## 2025-04-24 PROCEDURE — 81025 URINE PREGNANCY TEST: CPT | Performed by: OBSTETRICS & GYNECOLOGY

## 2025-04-24 PROCEDURE — 88307 TISSUE EXAM BY PATHOLOGIST: CPT | Performed by: STUDENT IN AN ORGANIZED HEALTH CARE EDUCATION/TRAINING PROGRAM

## 2025-04-24 PROCEDURE — 2720000007 HC OR 272 NO HCPCS: Performed by: OBSTETRICS & GYNECOLOGY

## 2025-04-24 PROCEDURE — 2500000001 HC RX 250 WO HCPCS SELF ADMINISTERED DRUGS (ALT 637 FOR MEDICARE OP): Performed by: OBSTETRICS & GYNECOLOGY

## 2025-04-24 PROCEDURE — A58301 PR REMOVE INTRAUTERINE DEVICE: Performed by: NURSE ANESTHETIST, CERTIFIED REGISTERED

## 2025-04-24 PROCEDURE — 7100000001 HC RECOVERY ROOM TIME - INITIAL BASE CHARGE: Performed by: OBSTETRICS & GYNECOLOGY

## 2025-04-24 PROCEDURE — 2500000004 HC RX 250 GENERAL PHARMACY W/ HCPCS (ALT 636 FOR OP/ED): Mod: JZ | Performed by: ANESTHESIOLOGY

## 2025-04-24 PROCEDURE — 3700000002 HC GENERAL ANESTHESIA TIME - EACH INCREMENTAL 1 MINUTE: Performed by: OBSTETRICS & GYNECOLOGY

## 2025-04-24 PROCEDURE — 2500000004 HC RX 250 GENERAL PHARMACY W/ HCPCS (ALT 636 FOR OP/ED): Mod: JZ | Performed by: NURSE ANESTHETIST, CERTIFIED REGISTERED

## 2025-04-24 RX ORDER — FENTANYL CITRATE 50 UG/ML
INJECTION, SOLUTION INTRAMUSCULAR; INTRAVENOUS AS NEEDED
Status: DISCONTINUED | OUTPATIENT
Start: 2025-04-24 | End: 2025-04-24

## 2025-04-24 RX ORDER — MIDAZOLAM HYDROCHLORIDE 1 MG/ML
INJECTION, SOLUTION INTRAMUSCULAR; INTRAVENOUS AS NEEDED
Status: DISCONTINUED | OUTPATIENT
Start: 2025-04-24 | End: 2025-04-24

## 2025-04-24 RX ORDER — ONDANSETRON HYDROCHLORIDE 2 MG/ML
INJECTION, SOLUTION INTRAVENOUS AS NEEDED
Status: DISCONTINUED | OUTPATIENT
Start: 2025-04-24 | End: 2025-04-24

## 2025-04-24 RX ORDER — ESMOLOL HYDROCHLORIDE 10 MG/ML
INJECTION INTRAVENOUS AS NEEDED
Status: DISCONTINUED | OUTPATIENT
Start: 2025-04-24 | End: 2025-04-24

## 2025-04-24 RX ORDER — KETOROLAC TROMETHAMINE 30 MG/ML
INJECTION, SOLUTION INTRAMUSCULAR; INTRAVENOUS AS NEEDED
Status: DISCONTINUED | OUTPATIENT
Start: 2025-04-24 | End: 2025-04-24

## 2025-04-24 RX ORDER — GABAPENTIN 300 MG/1
600 CAPSULE ORAL ONCE
Status: DISCONTINUED | OUTPATIENT
Start: 2025-04-24 | End: 2025-04-24 | Stop reason: HOSPADM

## 2025-04-24 RX ORDER — SODIUM CHLORIDE, SODIUM LACTATE, POTASSIUM CHLORIDE, CALCIUM CHLORIDE 600; 310; 30; 20 MG/100ML; MG/100ML; MG/100ML; MG/100ML
20 INJECTION, SOLUTION INTRAVENOUS CONTINUOUS
Status: DISCONTINUED | OUTPATIENT
Start: 2025-04-24 | End: 2025-04-24 | Stop reason: HOSPADM

## 2025-04-24 RX ORDER — PROPOFOL 10 MG/ML
INJECTION, EMULSION INTRAVENOUS AS NEEDED
Status: DISCONTINUED | OUTPATIENT
Start: 2025-04-24 | End: 2025-04-24

## 2025-04-24 RX ORDER — CELECOXIB 400 MG/1
400 CAPSULE ORAL ONCE
Status: COMPLETED | OUTPATIENT
Start: 2025-04-24 | End: 2025-04-24

## 2025-04-24 RX ORDER — LIDOCAINE HYDROCHLORIDE 20 MG/ML
INJECTION, SOLUTION INFILTRATION; PERINEURAL AS NEEDED
Status: DISCONTINUED | OUTPATIENT
Start: 2025-04-24 | End: 2025-04-24

## 2025-04-24 RX ORDER — ACETAMINOPHEN 325 MG/1
975 TABLET ORAL ONCE
Status: COMPLETED | OUTPATIENT
Start: 2025-04-24 | End: 2025-04-24

## 2025-04-24 RX ADMIN — ESMOLOL HYDROCHLORIDE 50 MG: 10 INJECTION, SOLUTION INTRAVENOUS at 11:52

## 2025-04-24 RX ADMIN — MIDAZOLAM 2 MG: 1 INJECTION INTRAMUSCULAR; INTRAVENOUS at 11:35

## 2025-04-24 RX ADMIN — PROPOFOL 50 MG: 10 INJECTION, EMULSION INTRAVENOUS at 11:55

## 2025-04-24 RX ADMIN — CELECOXIB 400 MG: 400 CAPSULE ORAL at 10:44

## 2025-04-24 RX ADMIN — KETOROLAC TROMETHAMINE 30 MG: 30 INJECTION, SOLUTION INTRAMUSCULAR at 11:45

## 2025-04-24 RX ADMIN — PROPOFOL 150 MG: 10 INJECTION, EMULSION INTRAVENOUS at 11:45

## 2025-04-24 RX ADMIN — LIDOCAINE HYDROCHLORIDE 100 MG: 20 INJECTION, SOLUTION INFILTRATION; PERINEURAL at 11:45

## 2025-04-24 RX ADMIN — FENTANYL CITRATE 50 MCG: 50 INJECTION, SOLUTION INTRAMUSCULAR; INTRAVENOUS at 12:11

## 2025-04-24 RX ADMIN — ONDANSETRON 4 MG: 2 INJECTION INTRAMUSCULAR; INTRAVENOUS at 11:45

## 2025-04-24 RX ADMIN — ACETAMINOPHEN 975 MG: 325 TABLET, FILM COATED ORAL at 10:45

## 2025-04-24 RX ADMIN — SODIUM CHLORIDE, SODIUM LACTATE, POTASSIUM CHLORIDE, AND CALCIUM CHLORIDE 20 ML/HR: .6; .31; .03; .02 INJECTION, SOLUTION INTRAVENOUS at 11:06

## 2025-04-24 SDOH — HEALTH STABILITY: MENTAL HEALTH: CURRENT SMOKER: 0

## 2025-04-24 ASSESSMENT — COLUMBIA-SUICIDE SEVERITY RATING SCALE - C-SSRS
2. HAVE YOU ACTUALLY HAD ANY THOUGHTS OF KILLING YOURSELF?: NO
1. IN THE PAST MONTH, HAVE YOU WISHED YOU WERE DEAD OR WISHED YOU COULD GO TO SLEEP AND NOT WAKE UP?: NO
6. HAVE YOU EVER DONE ANYTHING, STARTED TO DO ANYTHING, OR PREPARED TO DO ANYTHING TO END YOUR LIFE?: NO

## 2025-04-24 ASSESSMENT — PAIN SCALES - GENERAL
PAINLEVEL_OUTOF10: 0 - NO PAIN
PAIN_LEVEL: 2

## 2025-04-24 ASSESSMENT — PAIN - FUNCTIONAL ASSESSMENT: PAIN_FUNCTIONAL_ASSESSMENT: 0-10

## 2025-04-24 NOTE — ANESTHESIA PROCEDURE NOTES
Airway  Date/Time: 4/24/2025 11:47 AM  Reason: elective    Airway not difficult    Staffing  Performed: CRNA   Authorized by: Parish Can MD    Performed by: KATT Campuzano-SHELLI  Patient location during procedure: OR    Patient Condition  Indications for airway management: anesthesia  Sedation level: deep     Final Airway Details   Preoxygenated: yes  Final airway type: supraglottic airway  Successful airway: Supraglottic airway: iGel.  Size: 4  Number of attempts at approach: 1

## 2025-04-24 NOTE — ANESTHESIA PREPROCEDURE EVALUATION
Patient: Missy Paula    Procedure Information       Date/Time: 25 1125    Procedures:       EXCISION, LESION, CERVIX      REMOVAL, INTRAUTERINE DEVICE    Location: [facility] OR [facility] / [facility]    Surgeons: Brennen Ewing MD            Relevant Problems   No relevant active problems       Clinical information reviewed:                   NPO Detail:  No data recorded     Physical Exam    Airway  Mallampati: II  TM distance: >3 FB  Neck ROM: full  Mouth openin finger widths     Cardiovascular - normal exam   Dental    Pulmonary - normal exam   Abdominal - normal exam           Anesthesia Plan    History of general anesthesia?: yes  History of complications of general anesthesia?: no    ASA 2     general     The patient is not a current smoker.    Anesthetic plan and risks discussed with patient.    Plan discussed with CRNA and attending.

## 2025-04-24 NOTE — OP NOTE
Date: 2025  OR Location: [facility] OR    Name: Missy Paula, : 1985, Age: 39 y.o., MRN: [id number], Sex: female    Diagnosis  Pre-op Diagnosis      * Atypical squamous cells cannot exclude high grade squamous intraepithelial lesion on cytologic smear of cervix (ASC-H) [R87.611]     * Encounter for IUD removal [Z30.432] Post-op Diagnosis     * Atypical squamous cells cannot exclude high grade squamous intraepithelial lesion on cytologic smear of cervix (ASC-H) [R87.611]     * Encounter for IUD removal [Z30.432]     Procedures  EXCISION, LESION, CERVIX  14414 - [place] COLPOSCOPY CERVIX VAG ELTRD CONIZATION CERVIX    REMOVAL, INTRAUTERINE DEVICE  30260 - [place] REMOVAL INTRAUTERINE DEVICE IUD  Under general anesthetic lithotomy position patient prepped draped usual manner.  Largo insulated speculum placed.  Using the long Lisa IUD threads identified IUD removed with ease.  Using the loop electrode exocervix then excised.  Base of the cervix cauterized for hemostasis.  Blood loss 20 cc.  Counts correct.  Transferred to recovery room in stable condition.  Specimen of exocervix.    Surgeons      * Brennen Ewing - Primary    Resident/Fellow/Other Assistant:  Surgeons and Role:  * No surgeons found with a matching role *    Staff:   Circulator: Odessa Colon Person: Myrna  Surgical Assistant: Logan Reece Scrub: Jonathan CALZADAA: Parish    Anesthesia Staff: Anesthesiologist: Parish Can MD  CRNA: KATT Campuzano-CRNA    Procedure Summary  Anesthesia: General  ASA: II  Estimated Blood Loss: 20 mL  Intra-op Medications:   Administrations occurring from 1125 to 1218 on 25:   Medication Name Total Dose   esmolol 10 mg/mL 50 mg   ketorolac (Toradol) injection 30 mg 30 mg   lidocaine (Xylocaine) injection 2 % 100 mg   midazolam (Versed) injection 1 mg/mL 2 mg   ondansetron (Zofran) 2 mg/mL injection 4 mg   propofol (Diprivan) injection 10 mg/mL 200 mg              Anesthesia Record               Intraprocedure I/O  Totals       None           Specimen:   ID Type Source Tests Collected by Time   1 : EXOCERVIX Tissue CERVIX LEEP SURGICAL PATHOLOGY EXAM Brennen Ewing MD 4/24/2025 [id number]                 Procedure Details:  The patient was seen in the preoperative area. The site of surgery was properly noted/marked if necessary per policy. The patient has been actively warmed in preoperative area. Preoperative antibiotics are not indicated. Venous thrombosis prophylaxis have been ordered including bilateral sequential compression devices    Findings: IUD removal, excision of exocervix    Complications:  None; patient tolerated the procedure well.     Disposition: PACU - hemodynamically stable.  Condition: stable

## 2025-04-24 NOTE — ANESTHESIA POSTPROCEDURE EVALUATION
Patient: Missy Paula    Procedure Summary       Date: 04/24/25 Room / Location: [facility] OR 07 / [facility]    Anesthesia Start: 1140 Anesthesia Stop: 1214    Procedures:       EXCISION, LESION, CERVIX      REMOVAL, INTRAUTERINE DEVICE Diagnosis:       Atypical squamous cells cannot exclude high grade squamous intraepithelial lesion on cytologic smear of cervix (ASC-H)      Encounter for IUD removal      (Atypical squamous cells cannot exclude high grade squamous intraepithelial lesion on cytologic smear of cervix (ASC-H) [R87.611])      (Encounter for IUD removal [Z30.432])    Surgeons: Brennen Ewing MD Responsible Provider: Parish Can MD    Anesthesia Type: general ASA Status: 2            Anesthesia Type: general    Vitals Value Taken Time   /67 04/24/25 12:26   Temp 36.7 °C (98.1 °F) 04/24/25 12:11   Pulse 88 04/24/25 12:26   Resp 13 04/24/25 12:26   SpO2 97 % 04/24/25 12:26       Anesthesia Post Evaluation    Patient location during evaluation: PACU  Patient participation: complete - patient participated  Level of consciousness: awake  Pain score: 2  Pain management: adequate  Multimodal analgesia pain management approach  Airway patency: patent  Two or more strategies used to mitigate risk of obstructive sleep apnea  Cardiovascular status: acceptable  Respiratory status: acceptable  Hydration status: acceptable  Postoperative Nausea and Vomiting: none        No notable events documented.

## 2025-05-06 LAB
LABORATORY COMMENT REPORT: NORMAL
PATH REPORT.FINAL DX SPEC: NORMAL
PATH REPORT.GROSS SPEC: NORMAL
PATH REPORT.RELEVANT HX SPEC: NORMAL
PATH REPORT.TOTAL CANCER: NORMAL

## 2025-09-10 ENCOUNTER — APPOINTMENT (OUTPATIENT)
Dept: OBSTETRICS AND GYNECOLOGY | Facility: CLINIC | Age: 40
End: 2025-09-10
Payer: COMMERCIAL

## (undated) DEVICE — COVER HANDLE LIGHT, STERIS, BLUE, STERILE

## (undated) DEVICE — PAD, GROUNDING, ELECTROSURGICAL, W/9 FT CABLE, POLYHESIVE II, ADULT, LF

## (undated) DEVICE — Device

## (undated) DEVICE — ELECTRODE, LOOP T-BAR W20 D15

## (undated) DEVICE — ELECTRODE BALL, 5MM

## (undated) DEVICE — CAUTERY, PENCIL, PUSH BUTTON, SMOKE EVAC, 70MM